# Patient Record
Sex: FEMALE | Race: WHITE | NOT HISPANIC OR LATINO | Employment: OTHER | URBAN - METROPOLITAN AREA
[De-identification: names, ages, dates, MRNs, and addresses within clinical notes are randomized per-mention and may not be internally consistent; named-entity substitution may affect disease eponyms.]

---

## 2017-06-08 ENCOUNTER — TELEPHONE (OUTPATIENT)
Dept: HEMATOLOGY/ONCOLOGY | Facility: CLINIC | Age: 57
End: 2017-06-08

## 2017-06-08 DIAGNOSIS — T45.1X5A HOT FLASHES RELATED TO AROMATASE INHIBITOR THERAPY: Primary | ICD-10-CM

## 2017-06-08 DIAGNOSIS — R23.2 HOT FLASHES RELATED TO AROMATASE INHIBITOR THERAPY: Primary | ICD-10-CM

## 2017-06-08 NOTE — TELEPHONE ENCOUNTER
----- Message from Lindsay Pimentel sent at 6/8/2017 10:50 AM CDT -----  chacorta ortega request 06/08/17

## 2017-06-10 RX ORDER — VENLAFAXINE HYDROCHLORIDE 37.5 MG/1
37.5 CAPSULE, EXTENDED RELEASE ORAL DAILY
Qty: 90 CAPSULE | Refills: 3 | OUTPATIENT
Start: 2017-06-10 | End: 2017-06-12

## 2017-06-12 ENCOUNTER — TELEPHONE (OUTPATIENT)
Dept: HEMATOLOGY/ONCOLOGY | Facility: CLINIC | Age: 57
End: 2017-06-12

## 2017-06-12 DIAGNOSIS — C50.811 MALIGNANT NEOPLASM OF OVERLAPPING SITES OF RIGHT FEMALE BREAST: Primary | ICD-10-CM

## 2017-06-12 DIAGNOSIS — R23.2 HOT FLASHES RELATED TO AROMATASE INHIBITOR THERAPY: ICD-10-CM

## 2017-06-12 DIAGNOSIS — T45.1X5A HOT FLASHES RELATED TO AROMATASE INHIBITOR THERAPY: ICD-10-CM

## 2017-06-12 DIAGNOSIS — R60.0 BILATERAL LEG EDEMA: ICD-10-CM

## 2017-06-12 RX ORDER — ANASTROZOLE 1 MG/1
1 TABLET ORAL DAILY
Qty: 30 TABLET | Refills: 12 | Status: SHIPPED | OUTPATIENT
Start: 2017-06-12 | End: 2018-07-02 | Stop reason: SDUPTHER

## 2017-06-12 RX ORDER — POTASSIUM CHLORIDE 20 MEQ/1
20 TABLET, EXTENDED RELEASE ORAL DAILY
Status: DISCONTINUED | OUTPATIENT
Start: 2017-06-13 | End: 2017-06-12

## 2017-06-12 RX ORDER — POTASSIUM CHLORIDE 20 MEQ/1
20 TABLET, EXTENDED RELEASE ORAL DAILY
Qty: 30 TABLET | Refills: 12 | Status: SHIPPED | OUTPATIENT
Start: 2017-06-12 | End: 2017-07-12

## 2017-06-12 RX ORDER — VENLAFAXINE HYDROCHLORIDE 37.5 MG/1
37.5 CAPSULE, EXTENDED RELEASE ORAL 2 TIMES DAILY
Qty: 60 CAPSULE | Refills: 6 | Status: SHIPPED | OUTPATIENT
Start: 2017-06-12 | End: 2018-01-11 | Stop reason: SDUPTHER

## 2017-06-12 RX ORDER — FUROSEMIDE 20 MG/1
20 TABLET ORAL DAILY
Qty: 30 TABLET | Refills: 12 | Status: SHIPPED | OUTPATIENT
Start: 2017-06-12 | End: 2019-10-09 | Stop reason: SDUPTHER

## 2017-06-13 NOTE — TELEPHONE ENCOUNTER
I sent 2 Rx to [facility] for the KCL.  Call [facility] [place], ask them to only fill 1 rx for KCL  20meq #30, 1 po daily, 12 refills.

## 2017-06-14 DIAGNOSIS — T45.1X5A HOT FLASHES RELATED TO AROMATASE INHIBITOR THERAPY: ICD-10-CM

## 2017-06-14 DIAGNOSIS — R23.2 HOT FLASHES RELATED TO AROMATASE INHIBITOR THERAPY: ICD-10-CM

## 2017-06-14 RX ORDER — VENLAFAXINE HYDROCHLORIDE 37.5 MG/1
75 CAPSULE, EXTENDED RELEASE ORAL DAILY
Qty: 60 CAPSULE | Refills: 0 | Status: CANCELLED | OUTPATIENT
Start: 2017-06-14 | End: 2017-07-14

## 2017-07-11 ENCOUNTER — OFFICE VISIT (OUTPATIENT)
Dept: HEMATOLOGY/ONCOLOGY | Facility: CLINIC | Age: 57
End: 2017-07-11
Payer: COMMERCIAL

## 2017-07-11 VITALS
HEART RATE: 69 BPM | WEIGHT: 171.38 LBS | DIASTOLIC BLOOD PRESSURE: 87 MMHG | SYSTOLIC BLOOD PRESSURE: 130 MMHG | RESPIRATION RATE: 18 BRPM | TEMPERATURE: 98 F

## 2017-07-11 DIAGNOSIS — L98.9 SKIN LESION OF BACK: ICD-10-CM

## 2017-07-11 DIAGNOSIS — Z17.0 MALIGNANT NEOPLASM OF OVERLAPPING SITES OF RIGHT BREAST IN FEMALE, ESTROGEN RECEPTOR POSITIVE: Primary | ICD-10-CM

## 2017-07-11 DIAGNOSIS — C50.811 MALIGNANT NEOPLASM OF OVERLAPPING SITES OF RIGHT BREAST IN FEMALE, ESTROGEN RECEPTOR POSITIVE: Primary | ICD-10-CM

## 2017-07-11 PROCEDURE — 99214 OFFICE O/P EST MOD 30 MIN: CPT | Mod: ,,, | Performed by: INTERNAL MEDICINE

## 2017-07-11 RX ORDER — MULTIVITAMIN
1 TABLET ORAL DAILY
COMMUNITY

## 2017-07-11 NOTE — LETTER
July 13, 2017      Jeremias Coates MD  [address]  [address]  [facility] [address]           [facility] Hematology Oncology  [address] [facility] [facility]  [address]  [facility] [address]  Phone: [phone]  Fax: [phone]          Patient: Sigrid Edward   MR Number: [id number]   YOB: 1960   Date of Visit: 7/11/2017       Dear Dr. Jeremias Coates:    Thank you for referring Sigrid Edward to me for evaluation. Attached you will find relevant portions of my assessment and plan of care.    If you have questions, please do not hesitate to call me. I look forward to following Sigrid Edward along with you.    Sincerely,    Chely Deanosure  CC:  No Recipients    If you would like to receive this communication electronically, please contact [email or web address] or [phone] to request more information on [employer] Link access.    For providers and/or their staff who would like to refer a patient to [facility], please contact us through our one-stop-shop provider referral line, [facility] , at [phone].    If you feel you have received this communication in error or would no longer like to receive these types of communications, please e-mail [email or web address]

## 2017-07-14 ENCOUNTER — TELEPHONE (OUTPATIENT)
Dept: HEMATOLOGY/ONCOLOGY | Facility: CLINIC | Age: 57
End: 2017-07-14

## 2017-07-14 PROBLEM — C50.811 MALIGNANT NEOPLASM OF OVERLAPPING SITES OF RIGHT BREAST IN FEMALE, ESTROGEN RECEPTOR POSITIVE: Status: ACTIVE | Noted: 2017-07-14

## 2017-07-14 PROBLEM — Z17.0 MALIGNANT NEOPLASM OF OVERLAPPING SITES OF RIGHT BREAST IN FEMALE, ESTROGEN RECEPTOR POSITIVE: Status: ACTIVE | Noted: 2017-07-14

## 2017-07-15 NOTE — TELEPHONE ENCOUNTER
I finished charting on her for 7/11.  She has orders for mamm in EMR, schedule for her 12/2017.    Also make appt for her to see Dr. Carreon in [place] for removal of back lesion.  MD is dermatologist.

## 2017-07-15 NOTE — PROGRESS NOTES
[facility] History & Physical    Subjective:      Patient ID:   Sigrid Edward  57 y.o. female  1960  [place]      Chief Complaint:   Breast cancer follow up    HPI:  57 y.o. female with diagnosis of R breast cancer, Stage 1 dx. 1/11/15. Treated with R partial mastectomy and Rad Rx.  Presently on Arimidex adj. Rx.  HF sx better on Effexor.    Oncotype dx 7% recur with ad T or A.  ERP +, PRP +. Her 2 pedro  Neg.    Smoke 1/2 to 1 ppd.  ETOH no.  Allergy no.  Job special .    R partial mastectomy, partial hysterectomy.  M0  Menses onset at 11.  1st child at 21.  No increase joint sx.    Dad COPD. Mom NHL, DM.            ROS:   GEN: normal without any fever, night sweats or weight loss  HEENT: normal with no HA's, sore throat, stiff neck, changes in vision  CV: normal with no CP, SOB, PND, MEJIA or orthopnea  PULM: normal with no SOB, cough, hemoptysis, sputum or pleuritic pain  GI: normal with no abdominal pain, nausea, vomiting, constipation, diarrhea, melanotic stools, BRBPR, or hematemesis  : normal with no hematuria, dysuria  BREAST: See HPI  SKIN: normal with no rash, erythema, bruising, or swelling     Past Medical History:   Diagnosis Date    Breast cancer      Past Surgical History:   Procedure Laterality Date    HYSTERECTOMY         Review of patient's allergies indicates:  No Known Allergies  Social History     Social History    Marital status:      Spouse name: N/A    Number of children: N/A    Years of education: N/A     Occupational History    Not on file.     Social History Main Topics    Smoking status: Former Smoker     Quit date: 2016    Smokeless tobacco: Never Used    Alcohol use No    Drug use: No    Sexual activity: Not on file     Other Topics Concern    Not on file     Social History Narrative    No narrative on file         Current Outpatient Prescriptions:     anastrozole (ARIMIDEX) 1 mg Tab, Take 1 tablet (1 mg total) by mouth  once daily., Disp: 30 tablet, Rfl: 12    furosemide (LASIX) 20 MG tablet, Take 1 tablet (20 mg total) by mouth once daily., Disp: 30 tablet, Rfl: 12    multivitamin (ONE DAILY MULTIVITAMIN) per tablet, Take 1 tablet by mouth once daily., Disp: , Rfl:     venlafaxine (EFFEXOR XR) 37.5 MG 24 hr capsule, Take 1 capsule (37.5 mg total) by mouth 2 (two) times daily., Disp: 60 capsule, Rfl: 6          Objective:   Vitals:  Blood pressure 130/87, pulse 69, temperature 98 °F (36.7 °C), resp. rate 18, weight 77.7 kg (171 lb 6.4 oz).    Physical Examination:   GEN: no apparent distress, comfortable; AAOx3  HEAD: atraumatic and normocephalic  EYES: no pallor, no icterus, PERRLA  ENT: OMM, no pharyngeal erythema  NECK: no masses, thyroid normal, no LAD/LN's, supple  CV: RRR with no murmur; normal pulse  CHEST: Normal respiratory effort; CTAB; normal breath sounds; no wheeze or crackles  ABDOM: nontender and nondistended; soft; normal bowel sounds; no rebound/guarding  MUSC/Skeletal: ROM normal; no crepitus; joints normal  EXTREM: no clubbing, cyanosis, inflammation or swelling  SKIN: scaly lesion on R scapula area  : no escobar  NEURO: grossly intact; motor/sensory WNL; AAOx3; no tremors  PSYCH: normal mood, affect and behavior  LYMPH: normal cervical, supraclavicular, axillary and groin LN's  BREASTS:L & R breast NT without palpable mass    Radiology/Diagnostic Studies:    Mamm due 12/2017      Assessment:   (1) 57 y.o. female with diagnosis of Stage 1 Right breast cancer, on adjuvant arimidex daily.    (2)on effexor for HF sx.          1. Malignant neoplasm of overlapping sites of right breast in female, estrogen receptor positive    2. Skin lesion of back        Plan:       Malignant neoplasm of overlapping sites of right breast in female, estrogen receptor positive  -     Mammo Diagnostic Bilateral; Future; Expected date: 12/11/2017    Skin lesion of back  -     Ambulatory consult to Dermatology      Return in about 5  months (around 12/11/2017) for follow up on cancer status.    I have explained and the patient understands all of  the current recommendation(s). I have answered all of their questions to the best of my ability and to their complete satisfaction.

## 2017-12-18 ENCOUNTER — TELEPHONE (OUTPATIENT)
Dept: HEMATOLOGY/ONCOLOGY | Facility: CLINIC | Age: 57
End: 2017-12-18

## 2018-01-11 ENCOUNTER — TELEPHONE (OUTPATIENT)
Dept: HEMATOLOGY/ONCOLOGY | Facility: CLINIC | Age: 58
End: 2018-01-11

## 2018-01-11 DIAGNOSIS — R23.2 HOT FLASHES RELATED TO AROMATASE INHIBITOR THERAPY: ICD-10-CM

## 2018-01-11 DIAGNOSIS — T45.1X5A HOT FLASHES RELATED TO AROMATASE INHIBITOR THERAPY: ICD-10-CM

## 2018-01-11 RX ORDER — VENLAFAXINE HYDROCHLORIDE 37.5 MG/1
CAPSULE, EXTENDED RELEASE ORAL
Qty: 60 CAPSULE | Refills: 0 | Status: SHIPPED | OUTPATIENT
Start: 2018-01-11 | End: 2018-01-29 | Stop reason: SDUPTHER

## 2018-01-29 ENCOUNTER — OFFICE VISIT (OUTPATIENT)
Dept: HEMATOLOGY/ONCOLOGY | Facility: CLINIC | Age: 58
End: 2018-01-29
Payer: COMMERCIAL

## 2018-01-29 VITALS
RESPIRATION RATE: 18 BRPM | HEART RATE: 81 BPM | SYSTOLIC BLOOD PRESSURE: 118 MMHG | TEMPERATURE: 98 F | DIASTOLIC BLOOD PRESSURE: 86 MMHG | WEIGHT: 175.13 LBS

## 2018-01-29 DIAGNOSIS — T45.1X5A HOT FLASHES RELATED TO AROMATASE INHIBITOR THERAPY: ICD-10-CM

## 2018-01-29 DIAGNOSIS — K59.00 CONSTIPATION, UNSPECIFIED CONSTIPATION TYPE: Primary | ICD-10-CM

## 2018-01-29 DIAGNOSIS — R23.2 HOT FLASHES RELATED TO AROMATASE INHIBITOR THERAPY: ICD-10-CM

## 2018-01-29 PROCEDURE — 99214 OFFICE O/P EST MOD 30 MIN: CPT | Mod: ,,, | Performed by: INTERNAL MEDICINE

## 2018-01-29 RX ORDER — LACTULOSE 10 G/15ML
20 SOLUTION ORAL 2 TIMES DAILY
Qty: 500 ML | Refills: 3 | Status: SHIPPED | OUTPATIENT
Start: 2018-01-29 | End: 2018-04-26 | Stop reason: SDUPTHER

## 2018-01-29 RX ORDER — VENLAFAXINE HYDROCHLORIDE 37.5 MG/1
CAPSULE, EXTENDED RELEASE ORAL
Qty: 60 CAPSULE | Refills: 5 | Status: SHIPPED | OUTPATIENT
Start: 2018-01-29 | End: 2018-07-30 | Stop reason: SDUPTHER

## 2018-01-29 NOTE — LETTER
January 29, 2018      Jeremias Coates MD  [address]  [address]  [facility] [place] [address]           [facility] Hematology Oncology  [address] [facility]  [address]  [facility] [address]  Phone: [phone]  Fax: [phone]          Patient: Sigrid Edward   MR Number: [id number]   YOB: 1960   Date of Visit: 1/29/2018       Dear Dr. Jeremias Coates:    Thank you for referring Sigrid Edward to me for evaluation. Attached you will find relevant portions of my assessment and plan of care.    If you have questions, please do not hesitate to call me. I look forward to following Sigrid Edward along with you.    Sincerely,    RAVI Berg MD    Enclosure  CC:  No Recipients    If you would like to receive this communication electronically, please contact [email or web address] or [phone] to request more information on [employer] Link access.    For providers and/or their staff who would like to refer a patient to [facility], please contact us through our one-stop-shop provider referral line, [facility], at [phone].    If you feel you have received this communication in error or would no longer like to receive these types of communications, please e-mail [email or web address]

## 2018-01-30 NOTE — PROGRESS NOTES
[facility] History & Physical    Subjective:      Patient ID:   Sigrid Edward  57 y.o. female  1960  [place], [place]      Chief Complaint:   Breast cancer follow up    HPI:  57 y.o. female with diagnosis of R breast cancer, Stage 1 dx. 1/11/15. Treated with R partial mastectomy and Rad Rx.  Presently on Arimidex adj. Rx.  HF sx better on Effexor.     C/O constipation sx.  Start chronulac syrup, Refer to Dr. Kline for colonoscopy.    Oncotype dx 7% recur with adjuvant T or A.  ERP +, PRP +. Her 2 pedro  Neg.    Smoke 1/2 to 1 ppd.  ETOH no.  Allergy no.  Job special .    R partial mastectomy, partial hysterectomy.  M0  Menses onset at 11.  1st child at 21.  No increase joint sx.    Dad COPD. Mom NHL, DM. Recent shingles.    ROS:   GEN: normal without any fever, night sweats or weight loss  HEENT: normal with no HA's, sore throat, stiff neck, changes in vision  CV: normal with no CP, SOB, PND, MEJIA or orthopnea  PULM: normal with no SOB, cough, hemoptysis, sputum or pleuritic pain  GI: normal with no abdominal pain, nausea, vomiting, constipation, diarrhea, melanotic stools, BRBPR, or hematemesis  : normal with no hematuria, dysuria  BREAST: See HPI  SKIN: normal with no rash, erythema, bruising, or swelling     Past Medical History:   Diagnosis Date    Breast cancer      Past Surgical History:   Procedure Laterality Date    HYSTERECTOMY         Review of patient's allergies indicates:  No Known Allergies  Social History     Social History    Marital status:      Spouse name: N/A    Number of children: N/A    Years of education: N/A     Occupational History    Not on file.     Social History Main Topics    Smoking status: Former Smoker     Quit date: 2016    Smokeless tobacco: Never Used    Alcohol use No    Drug use: No    Sexual activity: Not on file     Other Topics Concern    Not on file     Social History Narrative    No narrative on file         Current  Outpatient Prescriptions:     anastrozole (ARIMIDEX) 1 mg Tab, Take 1 tablet (1 mg total) by mouth once daily., Disp: 30 tablet, Rfl: 12    furosemide (LASIX) 20 MG tablet, Take 1 tablet (20 mg total) by mouth once daily., Disp: 30 tablet, Rfl: 12    lactulose (CHRONULAC) 20 gram/30 mL Soln, Take 30 mLs (20 g total) by mouth 2 (two) times daily., Disp: 500 mL, Rfl: 3    multivitamin (ONE DAILY MULTIVITAMIN) per tablet, Take 1 tablet by mouth once daily., Disp: , Rfl:     venlafaxine (EFFEXOR-XR) 37.5 MG 24 hr capsule, TAKE 1 CAPSULE(37.5 MG) BY MOUTH TWICE DAILY, Disp: 60 capsule, Rfl: 5          Objective:   Vitals:  Blood pressure 118/86, pulse 81, temperature 97.9 °F (36.6 °C), resp. rate 18, weight 79.4 kg (175 lb 1.6 oz).    Physical Examination:   GEN: no apparent distress, comfortable  HEAD: atraumatic and normocephalic  EYES: no pallor, no icterus  ENT:  no pharyngeal erythema  NECK: no masses, thyroid normal, no LAD/LN's, supple  CV: RRR with no murmur; normal pulse  CHEST: Normal respiratory effort; CTAB; normal breath sounds; no wheeze or crackles  ABDOM: nontender and nondistended; soft; normal bowel sounds; no rebound/guarding  MUSC/Skeletal: ROM normal; no crepitus; joints normal  EXTREM: no clubbing, cyanosis, inflammation or swelling  SKIN: negative exam  : no escobar  NEURO: grossly intact; motor/sensory WNL;  no tremors  PSYCH: normal mood, affect and behavior  LYMPH: normal cervical, supraclavicular, axillary and groin LN's  BREASTS:L & R breast NT without palpable mass    Assessment:   (1) 57 y.o. female with diagnosis of Stage 1 Right breast cancer, on adjuvant arimidex daily.  RTC 6 months with mammogram.    (2)on effexor for HF sx.  RTC 6 months.    (3) constipation sx, chronulac syrup trial, refer to Dr. Kline for colonoscopy.      1. Constipation, unspecified constipation type    2. Hot flashes related to aromatase inhibitor therapy               Constipation, unspecified  constipation type  -     Ambulatory referral to Gastroenterology  -     lactulose (CHRONULAC) 20 gram/30 mL Soln; Take 30 mLs (20 g total) by mouth 2 (two) times daily.  Dispense: 500 mL; Refill: 3    Hot flashes related to aromatase inhibitor therapy  -     venlafaxine (EFFEXOR-XR) 37.5 MG 24 hr capsule; TAKE 1 CAPSULE(37.5 MG) BY MOUTH TWICE DAILY  Dispense: 60 capsule; Refill: 5      Follow-up in about 6 months (around 7/29/2018) for follow up on cancer status.

## 2018-04-26 DIAGNOSIS — K59.00 CONSTIPATION, UNSPECIFIED CONSTIPATION TYPE: ICD-10-CM

## 2018-04-29 RX ORDER — LACTULOSE 10 G/15ML
SOLUTION ORAL; RECTAL
Qty: 500 ML | Refills: 0 | Status: SHIPPED | OUTPATIENT
Start: 2018-04-29 | End: 2018-07-30 | Stop reason: SDUPTHER

## 2018-07-02 DIAGNOSIS — C50.811 MALIGNANT NEOPLASM OF OVERLAPPING SITES OF RIGHT FEMALE BREAST: ICD-10-CM

## 2018-07-02 NOTE — TELEPHONE ENCOUNTER
Medication order sent to Dr Berg to sign for refill.  Prescription to be sent electronically to the pharmacy once the order is signed by Dr Berg

## 2018-07-02 NOTE — TELEPHONE ENCOUNTER
----- Message from Archana Forman sent at 7/2/2018 10:07 AM CDT -----  Pt called and needs refill of Arimidex to [facility] [address] in [place].    CB# [phone]    Thanks,  Archana

## 2018-07-03 RX ORDER — ANASTROZOLE 1 MG/1
TABLET ORAL
Qty: 30 TABLET | Refills: 0 | Status: SHIPPED | OUTPATIENT
Start: 2018-07-03 | End: 2018-07-30 | Stop reason: SDUPTHER

## 2018-07-03 RX ORDER — ANASTROZOLE 1 MG/1
TABLET ORAL
Qty: 30 TABLET | Refills: 0 | Status: SHIPPED | OUTPATIENT
Start: 2018-07-03 | End: 2019-01-02 | Stop reason: SDUPTHER

## 2018-07-30 ENCOUNTER — OFFICE VISIT (OUTPATIENT)
Dept: HEMATOLOGY/ONCOLOGY | Facility: CLINIC | Age: 58
End: 2018-07-30
Payer: COMMERCIAL

## 2018-07-30 VITALS
DIASTOLIC BLOOD PRESSURE: 86 MMHG | RESPIRATION RATE: 18 BRPM | SYSTOLIC BLOOD PRESSURE: 121 MMHG | TEMPERATURE: 98 F | HEART RATE: 76 BPM | WEIGHT: 160.19 LBS

## 2018-07-30 DIAGNOSIS — K59.00 CONSTIPATION, UNSPECIFIED CONSTIPATION TYPE: ICD-10-CM

## 2018-07-30 DIAGNOSIS — Z17.0 MALIGNANT NEOPLASM OF OVERLAPPING SITES OF RIGHT BREAST IN FEMALE, ESTROGEN RECEPTOR POSITIVE: ICD-10-CM

## 2018-07-30 DIAGNOSIS — R23.2 HOT FLASHES RELATED TO AROMATASE INHIBITOR THERAPY: ICD-10-CM

## 2018-07-30 DIAGNOSIS — C50.811 MALIGNANT NEOPLASM OF OVERLAPPING SITES OF RIGHT BREAST IN FEMALE, ESTROGEN RECEPTOR POSITIVE: ICD-10-CM

## 2018-07-30 DIAGNOSIS — T45.1X5A HOT FLASHES RELATED TO AROMATASE INHIBITOR THERAPY: ICD-10-CM

## 2018-07-30 PROCEDURE — 99214 OFFICE O/P EST MOD 30 MIN: CPT | Mod: ,,, | Performed by: INTERNAL MEDICINE

## 2018-07-30 RX ORDER — VENLAFAXINE HYDROCHLORIDE 37.5 MG/1
CAPSULE, EXTENDED RELEASE ORAL
Qty: 60 CAPSULE | Refills: 6 | Status: SHIPPED | OUTPATIENT
Start: 2018-07-30 | End: 2020-04-27

## 2018-07-30 RX ORDER — ANASTROZOLE 1 MG/1
TABLET ORAL
Qty: 30 TABLET | Refills: 6 | Status: SHIPPED | OUTPATIENT
Start: 2018-07-30 | End: 2019-01-02 | Stop reason: SDUPTHER

## 2018-07-30 RX ORDER — LACTULOSE 10 G/15ML
SOLUTION ORAL; RECTAL
Qty: 500 ML | Refills: 1 | Status: SHIPPED | OUTPATIENT
Start: 2018-07-30 | End: 2023-02-07

## 2018-07-30 NOTE — LETTER
July 30, 2018      Jeremias Coates MD  [address]  [address]  [facility]  [place] [address]           [facility] - Hematology Oncology  [address] [facility] [address] [facility]  [address]  [place] [address]  Phone: [phone]  Fax: [phone]          Patient: Sigrid Edward   MR Number: [id number]   YOB: 1960   Date of Visit: 7/30/2018       Dear Dr. Jeremias Coates:    Thank you for referring Sigrid Edward to me for evaluation. Attached you will find relevant portions of my assessment and plan of care.    If you have questions, please do not hesitate to call me. I look forward to following Sigrid Edward along with you.    Sincerely,    RAVI Berg MD    Enclosure  CC:  No Recipients    If you would like to receive this communication electronically, please contact [email or web address] or [phone] to request more information on [employer] Link access.    For providers and/or their staff who would like to refer a patient to [facility], please contact us through our one-stop-shop provider referral line, [facility], at [phone].    If you feel you have received this communication in error or would no longer like to receive these types of communications, please e-mail [email or web address]

## 2018-07-31 NOTE — PROGRESS NOTES
[facility] History & Physical    Subjective:      Patient ID:   Sigrid Edward  58 y.o. female  1960  [place], [place]      Chief Complaint:   Breast cancer follow up    HPI:  58 y.o. female with diagnosis of R breast cancer, Stage 1 dx. 1/11/15. Treated with R partial mastectomy and Rad Rx.  Presently on Arimidex adj. Rx.  HF sx better on Effexor BID.    C/O constipation sx.  Start chronulac syrup, Refer to Dr. Kline for colonoscopy.    Oncotype dx 7% recur with adjuvant T or A.  ERP +, PRP +. Her 2 pedro  Neg.    Smoke 1/2 to 1 ppd.  ETOH no.  Allergy no.  Job special .    R partial mastectomy, partial hysterectomy.  M0  Menses onset at 11.  1st child at 21.  No increase joint sx.    Dad COPD. Mom NHL, DM. Recent shingles.    ROS:   GEN: normal without any fever, night sweats or weight loss  HEENT: normal with no HA's, sore throat, stiff neck, changes in vision  CV: normal with no CP, SOB, PND, MEJIA or orthopnea  PULM: normal with no SOB, cough, hemoptysis, sputum or pleuritic pain  GI: normal with no abdominal pain, nausea, vomiting, constipation, diarrhea, melanotic stools, BRBPR, or hematemesis  : normal with no hematuria, dysuria  BREAST: See HPI  SKIN: normal with no rash, erythema, bruising, or swelling     Past Medical History:   Diagnosis Date    Breast cancer      Past Surgical History:   Procedure Laterality Date    HYSTERECTOMY         Review of patient's allergies indicates:  No Known Allergies  Social History     Social History    Marital status:      Spouse name: N/A    Number of children: N/A    Years of education: N/A     Occupational History    Not on file.     Social History Main Topics    Smoking status: Former Smoker     Quit date: 2016    Smokeless tobacco: Never Used    Alcohol use No    Drug use: No    Sexual activity: Not on file     Other Topics Concern    Not on file     Social History Narrative    No narrative on file          Current Outpatient Prescriptions:     anastrozole (ARIMIDEX) 1 mg Tab, TAKE 1 TABLET(1 MG) BY MOUTH EVERY DAY, Disp: 30 tablet, Rfl: 0    anastrozole (ARIMIDEX) 1 mg Tab, TAKE 1 TABLET(1 MG) BY MOUTH EVERY DAY, Disp: 30 tablet, Rfl: 6    furosemide (LASIX) 20 MG tablet, Take 1 tablet (20 mg total) by mouth once daily., Disp: 30 tablet, Rfl: 12    lactulose (CHRONULAC) 10 gram/15 mL solution, TAKE 2 TABLESPOONSFUL TWICE DAILY, Disp: 500 mL, Rfl: 1    multivitamin (ONE DAILY MULTIVITAMIN) per tablet, Take 1 tablet by mouth once daily., Disp: , Rfl:     venlafaxine (EFFEXOR-XR) 37.5 MG 24 hr capsule, TAKE 1 CAPSULE(37.5 MG) BY MOUTH TWICE DAILY, Disp: 60 capsule, Rfl: 6          Objective:   Vitals:  Blood pressure 121/86, pulse 76, temperature 98.2 °F (36.8 °C), resp. rate 18, weight 72.7 kg (160 lb 3.2 oz).    Physical Examination:   GEN: no apparent distress, comfortable  HEAD: atraumatic and normocephalic  EYES: no pallor, no icterus  ENT:  no pharyngeal erythema  NECK: no masses, thyroid normal, no LAD/LN's, supple  CV: RRR with no murmur; normal pulse  CHEST: Normal respiratory effort; CTAB; normal breath sounds; no wheeze or crackles  ABDOM: nontender and nondistended; soft; normal bowel sounds; no rebound/guarding  MUSC/Skeletal: ROM normal; no crepitus; joints normal  EXTREM: no clubbing, cyanosis, inflammation or swelling  SKIN: negative exam  : no escobar  NEURO: grossly intact; motor/sensory WNL;  no tremors  PSYCH: normal mood, affect and behavior  LYMPH: normal cervical, supraclavicular, axillary and groin LN's  BREASTS:L & R breast NT without palpable mass    Assessment:   (1) 58 y.o. female with diagnosis of Stage 1 Right breast cancer, on adjuvant arimidex daily.  RTC 6 months with mammogram.    (2)on effexor for HF sx.  RTC 6 months.    (3) constipation sx, chronulac syrup trial, refer to Dr. Kline for colonoscopy.      1. Malignant neoplasm of overlapping sites of right breast in  female, estrogen receptor positive    2. Constipation, unspecified constipation type    3. Hot flashes related to aromatase inhibitor therapy               Malignant neoplasm of overlapping sites of right breast in female, estrogen receptor positive  -     anastrozole (ARIMIDEX) 1 mg Tab; TAKE 1 TABLET(1 MG) BY MOUTH EVERY DAY  Dispense: 30 tablet; Refill: 6  -     Mammo Digital Diagnostic Bilat with CAD; Future; Expected date: 12/17/2018    Constipation, unspecified constipation type  -     lactulose (CHRONULAC) 10 gram/15 mL solution; TAKE 2 TABLESPOONSFUL TWICE DAILY  Dispense: 500 mL; Refill: 1    Hot flashes related to aromatase inhibitor therapy  -     venlafaxine (EFFEXOR-XR) 37.5 MG 24 hr capsule; TAKE 1 CAPSULE(37.5 MG) BY MOUTH TWICE DAILY  Dispense: 60 capsule; Refill: 6      Follow-up in about 6 months (around 1/30/2019) for follow up on cancer status.

## 2019-01-02 ENCOUNTER — OFFICE VISIT (OUTPATIENT)
Dept: HEMATOLOGY/ONCOLOGY | Facility: CLINIC | Age: 59
End: 2019-01-02
Payer: COMMERCIAL

## 2019-01-02 VITALS
HEART RATE: 69 BPM | TEMPERATURE: 98 F | DIASTOLIC BLOOD PRESSURE: 86 MMHG | WEIGHT: 164.31 LBS | SYSTOLIC BLOOD PRESSURE: 129 MMHG | RESPIRATION RATE: 20 BRPM

## 2019-01-02 DIAGNOSIS — C50.811 MALIGNANT NEOPLASM OF OVERLAPPING SITES OF RIGHT BREAST IN FEMALE, ESTROGEN RECEPTOR POSITIVE: Primary | ICD-10-CM

## 2019-01-02 DIAGNOSIS — Z17.0 MALIGNANT NEOPLASM OF OVERLAPPING SITES OF RIGHT BREAST IN FEMALE, ESTROGEN RECEPTOR POSITIVE: Primary | ICD-10-CM

## 2019-01-02 PROCEDURE — 99214 PR OFFICE/OUTPT VISIT, EST, LEVL IV, 30-39 MIN: ICD-10-PCS | Mod: ,,, | Performed by: INTERNAL MEDICINE

## 2019-01-02 PROCEDURE — 99214 OFFICE O/P EST MOD 30 MIN: CPT | Mod: ,,, | Performed by: INTERNAL MEDICINE

## 2019-01-02 RX ORDER — VENLAFAXINE HYDROCHLORIDE 37.5 MG/1
37.5 CAPSULE, EXTENDED RELEASE ORAL DAILY
Qty: 30 CAPSULE | Refills: 11 | Status: SHIPPED | OUTPATIENT
Start: 2019-01-02 | End: 2020-01-02

## 2019-01-02 RX ORDER — ANASTROZOLE 1 MG/1
1 TABLET ORAL DAILY
Qty: 30 TABLET | Refills: 12 | Status: SHIPPED | OUTPATIENT
Start: 2019-01-02 | End: 2020-01-02

## 2019-01-02 RX ORDER — ANASTROZOLE 1 MG/1
TABLET ORAL
Qty: 30 TABLET | Refills: 12 | Status: SHIPPED | OUTPATIENT
Start: 2019-01-02 | End: 2023-02-07

## 2019-01-02 RX ORDER — ANASTROZOLE 1 MG/1
TABLET ORAL
Qty: 30 TABLET | Refills: 6 | Status: SHIPPED | OUTPATIENT
Start: 2019-01-02 | End: 2023-02-07

## 2019-01-02 NOTE — LETTER
January 6, 2019      Jeremias Coates MD  [address]  [address]  [facility]  [place] [address]           [facility] - Hematology Oncology  [address] [facility]  [address]  [place] [address]  Phone: [phone]  Fax: [phone]          Patient: Sigrid Edward   MR Number: [id number]   YOB: 1960   Date of Visit: 1/2/2019       Dear Dr. Jeremias Coates:    Thank you for referring Sigrid Edward to me for evaluation. Attached you will find relevant portions of my assessment and plan of care.    If you have questions, please do not hesitate to call me. I look forward to following Sigrid Edward along with you.    Sincerely,    RAVI Berg MD    Enclosure  CC:  No Recipients    If you would like to receive this communication electronically, please contact [email or web address] or [phone] to request more information on [employer] Link access.    For providers and/or their staff who would like to refer a patient to [facility], please contact us through our one-stop-shop provider referral line, [facility] [place], at [phone].    If you feel you have received this communication in error or would no longer like to receive these types of communications, please e-mail [email or web address]

## 2019-01-02 NOTE — PATIENT INSTRUCTIONS
What Is Breast Cancer?  Having breast cancer means that some cells in your breast have changed and are growing out of control. Learning about the different types and stages of breast cancer can help you take an active role in your treatment.  Changes in your breast  Your entire body is made of living tissue. This tissue is made up of tiny cells. You can't see these cells with the naked eye. Normal cells reproduce (divide) in a controlled way. They grow when your body needs them, and die when your body does not need them any longer. When you have cancer, some cells become abnormal. These cells may divide quickly, don't die when they should, and spread into other parts of the body.      Normal breast tissue is made of healthy cells. They reproduce new cells that look and work the same. Noninvasive breast cancer(carcinoma in situ) happens when cancer cells are only in the ducts.       Invasive breast cancer happens when cancer cells move out of the ducts or lobules into the surrounding breast tissue. Metastasis happens when cancer cells move into the lymph nodes or bloodstream and travel to another part of the body.      Stages of breast cancer  Several tests are used to measure the size of a tumor and learn how far it has spread. This is called staging. The stage of your cancer will help determine your treatment. Based on National Cancer [place] guidelines, the stages of breast cancer are:  · Stage 0. The cancer is noninvasive. Cancer cells are found only in the ducts (ductal carcinoma in situ).  · Stage I. The tumor is 2 cm (about 3/4 of an inch) or less in diameter. It has invaded the surrounding breast tissue, and tiny amounts of cancer cells may be found in the underarm lymph nodes.  · Stage II. The tumor is larger than 2 cm and has not spread to lymph nodes, or the cancer is less than 5 cm across and has spread to the lymph nodes under the arm.  · Stage III. The tumor is less than 5 cm (2 inches) across, and  there's a lot of cancer in your underarm lymph nodes, or it has spread to other lymph nodes. Or the tumor is larger than 5 cm and has spread to lymph nodes. Or the tumor is any size and has spread to the skin, chest wall, and maybe to nearby lymph nodes.  · Stage IV. The tumor has spread beyond the breast to the bones, lungs, liver, brain, or lymph nodes far away from the breast.  Recurrent breast cancer. When the cancer returns after treatment.   Date Last Reviewed: 9/21/2015 © 2000-2017 [employer]. [address] [place] [id number]. All rights reserved. This information is not intended as a substitute for professional medical care. Always follow your healthcare professional's instructions.

## 2019-01-06 NOTE — PROGRESS NOTES
[facility] History & Physical    Subjective:      Patient ID:   Sigrid Edward  58 y.o. female  1960  Hawk Orta, Eliud Shen      Chief Complaint:   Breast cancer follow up    HPI:  58 y.o. female with diagnosis of R breast cancer, Stage 1 dx. 1/11/15. Treated with R partial mastectomy and Rad Rx.  Presently on Arimidex adj. Rx.  HF sx better on Effexor BID.    C/O constipation sx.  Start chronulac syrup, Refer to Dr. Kline for colonoscopy.    Oncotype dx 7% recur with adjuvant T or A.  ERP +, PRP +. Her 2 pedro  Neg.    Smoke 1/2 to 1 ppd.  ETOH no.  Allergy no.  Job special .    R partial mastectomy, partial hysterectomy.  M0  Menses onset at 11.  1st child at 21.  No increase joint sx.    Dad COPD. Mom NHL, DM. Recent shingles.    ROS:   GEN: normal without any fever, night sweats or weight loss  HEENT: normal with no HA's, sore throat, stiff neck, changes in vision  CV: normal with no CP, SOB, PND, MEJIA or orthopnea  PULM: normal with no SOB, cough, hemoptysis, sputum or pleuritic pain  GI: normal with no abdominal pain, nausea, vomiting, constipation, diarrhea, melanotic stools, BRBPR, or hematemesis  : normal with no hematuria, dysuria  BREAST: See HPI  SKIN: normal with no rash, erythema, bruising, or swelling     Past Medical History:   Diagnosis Date    Breast cancer      Past Surgical History:   Procedure Laterality Date    HYSTERECTOMY         Review of patient's allergies indicates:  No Known Allergies  Social History     Socioeconomic History    Marital status:      Spouse name: Not on file    Number of children: Not on file    Years of education: Not on file    Highest education level: Not on file   Social Needs    Financial resource strain: Not on file    Food insecurity - worry: Not on file    Food insecurity - inability: Not on file    Transportation needs - medical: Not on file    Transportation needs - non-medical: Not on file   Occupational  History    Not on file   Tobacco Use    Smoking status: Former Smoker     Last attempt to quit: 2016     Years since quittin.4    Smokeless tobacco: Never Used   Substance and Sexual Activity    Alcohol use: No    Drug use: No    Sexual activity: Not on file   Other Topics Concern    Not on file   Social History Narrative    Not on file         Current Outpatient Medications:     anastrozole (ARIMIDEX) 1 mg Tab, TAKE 1 TABLET(1 MG) BY MOUTH EVERY DAY., Disp: 30 tablet, Rfl: 12    anastrozole (ARIMIDEX) 1 mg Tab, TAKE 1 TABLET(1 MG) BY MOUTH EVERY DAY., Disp: 30 tablet, Rfl: 6    lactulose (CHRONULAC) 10 gram/15 mL solution, TAKE 2 TABLESPOONSFUL TWICE DAILY, Disp: 500 mL, Rfl: 1    multivitamin (ONE DAILY MULTIVITAMIN) per tablet, Take 1 tablet by mouth once daily., Disp: , Rfl:     venlafaxine (EFFEXOR-XR) 37.5 MG 24 hr capsule, TAKE 1 CAPSULE(37.5 MG) BY MOUTH TWICE DAILY, Disp: 60 capsule, Rfl: 6    anastrozole (ARIMIDEX) 1 mg Tab, Take 1 tablet (1 mg total) by mouth once daily., Disp: 30 tablet, Rfl: 12    furosemide (LASIX) 20 MG tablet, Take 1 tablet (20 mg total) by mouth once daily., Disp: 30 tablet, Rfl: 12    venlafaxine (EFFEXOR XR) 37.5 MG 24 hr capsule, Take 1 capsule (37.5 mg total) by mouth once daily., Disp: 30 capsule, Rfl: 11          Objective:   Vitals:  Blood pressure 129/86, pulse 69, temperature 97.7 °F (36.5 °C), resp. rate 20, weight 74.5 kg (164 lb 4.8 oz).    Physical Examination:   GEN: no apparent distress, comfortable  HEAD: atraumatic and normocephalic  EYES: no pallor, no icterus  ENT:  no pharyngeal erythema  NECK: no masses, thyroid normal, no LAD/LN's, supple  CV: RRR with no murmur; normal pulse  CHEST: Normal respiratory effort; CTAB; normal breath sounds; no wheeze or crackles  ABDOM: nontender and nondistended; soft;  no rebound/guarding  MUSC/Skeletal: ROM normal; no crepitus; joints normal  EXTREM: no clubbing, cyanosis, inflammation or  swelling  SKIN: negative exam  : no escobar  NEURO: grossly intact; motor/sensory WNL;  no tremors  PSYCH: normal mood, affect and behavior  LYMPH: normal cervical, supraclavicular, axillary and groin LN's  BREASTS:L & R breast NT without palpable mass    Assessment:   (1) 58 y.o. female with diagnosis of Stage 1 Right breast cancer, on adjuvant arimidex daily.  RTC 6 months with mammogram.    (2)on effexor for HF sx.  RTC 6 months.    (3) constipation sx, chronulac syrup trial, refer to Dr. Kline for colonoscopy.      1. Malignant neoplasm of overlapping sites of right breast in female, estrogen receptor positive               Malignant neoplasm of overlapping sites of right breast in female, estrogen receptor positive  -     Ambulatory referral to Gastroenterology  -     anastrozole (ARIMIDEX) 1 mg Tab; Take 1 tablet (1 mg total) by mouth once daily.  Dispense: 30 tablet; Refill: 12  -     venlafaxine (EFFEXOR XR) 37.5 MG 24 hr capsule; Take 1 capsule (37.5 mg total) by mouth once daily.  Dispense: 30 capsule; Refill: 11  -     anastrozole (ARIMIDEX) 1 mg Tab; TAKE 1 TABLET(1 MG) BY MOUTH EVERY DAY.  Dispense: 30 tablet; Refill: 12  -     anastrozole (ARIMIDEX) 1 mg Tab; TAKE 1 TABLET(1 MG) BY MOUTH EVERY DAY.  Dispense: 30 tablet; Refill: 6      Follow-up in about 6 months (around 7/2/2019) for follow up on cancer status.

## 2019-03-28 ENCOUNTER — TELEPHONE (OUTPATIENT)
Dept: HEMATOLOGY/ONCOLOGY | Facility: CLINIC | Age: 59
End: 2019-03-28

## 2019-03-28 NOTE — TELEPHONE ENCOUNTER
----- Message from Archana Forman sent at 3/27/2019 12:14 PM CDT -----  Contact: To, spouse  Pt takes 2 of the Effexor XR capsules by mouth daily.  Rx was written for 30 only.  Please call in new rx to pharmacy.      CB# [phone]

## 2019-04-23 LAB — CRC RECOMMENDATION EXT: NORMAL

## 2019-08-01 ENCOUNTER — OFFICE VISIT (OUTPATIENT)
Dept: HEMATOLOGY/ONCOLOGY | Facility: CLINIC | Age: 59
End: 2019-08-01
Payer: COMMERCIAL

## 2019-08-01 DIAGNOSIS — Z17.0 MALIGNANT NEOPLASM OF OVERLAPPING SITES OF RIGHT BREAST IN FEMALE, ESTROGEN RECEPTOR POSITIVE: Primary | ICD-10-CM

## 2019-08-01 DIAGNOSIS — I83.893 VARICOSE VEINS OF BOTH LEGS WITH EDEMA: ICD-10-CM

## 2019-08-01 DIAGNOSIS — C50.811 MALIGNANT NEOPLASM OF OVERLAPPING SITES OF RIGHT BREAST IN FEMALE, ESTROGEN RECEPTOR POSITIVE: Primary | ICD-10-CM

## 2019-08-01 PROCEDURE — 99214 OFFICE O/P EST MOD 30 MIN: CPT | Mod: S$GLB,,, | Performed by: INTERNAL MEDICINE

## 2019-08-01 PROCEDURE — 99214 PR OFFICE/OUTPT VISIT, EST, LEVL IV, 30-39 MIN: ICD-10-PCS | Mod: S$GLB,,, | Performed by: INTERNAL MEDICINE

## 2019-08-05 NOTE — PROGRESS NOTES
[facility] History & Physical    Subjective:      Patient ID:   Sigrid Edward  59 y.o. female  1960  Hawk Orta, Eliud Shen      Chief Complaint:   Breast cancer follow up    HPI:  59 y.o. female with diagnosis of R breast cancer, Stage 1 dx. 1/11/15. Treated with R partial mastectomy and Rad Rx.  Presently on Arimidex adj. Rx.  HF sx better on Effexor BID.     Referred to Dr. Kline for colonoscopy.Negative results per pt.    Skin cancers removed from face, Dr. Booker.    Oncotype dx 7% recur with adjuvant T or A.  ERP +, PRP +. Her 2 pedro  Neg.    Smoke 1/2 to 1 ppd.  ETOH no.  Allergy no.  Job special .    R partial mastectomy, partial hysterectomy.  M0  Menses onset at 11.  1st child at 21.  No increase joint sx.    Dad COPD. Mom NHL, DM. Recent shingles.    ROS:   GEN: normal without any fever, night sweats or weight loss  HEENT: normal with no HA's, sore throat, stiff neck, changes in vision  CV: normal with no CP, SOB, PND, MEJIA or orthopnea  PULM: normal with no SOB, cough, hemoptysis, sputum or pleuritic pain  GI: normal with no abdominal pain, nausea, vomiting, constipation, diarrhea, melanotic stools, BRBPR, or hematemesis  : normal with no hematuria, dysuria  BREAST: See HPI  SKIN: normal with no rash, erythema, bruising, or swelling     Past Medical History:   Diagnosis Date    Breast cancer      Past Surgical History:   Procedure Laterality Date    HYSTERECTOMY         Review of patient's allergies indicates:  No Known Allergies  Social History     Socioeconomic History    Marital status:      Spouse name: Not on file    Number of children: Not on file    Years of education: Not on file    Highest education level: Not on file   Occupational History    Not on file   Social Needs    Financial resource strain: Not on file    Food insecurity:     Worry: Not on file     Inability: Not on file    Transportation needs:     Medical: Not on file      Non-medical: Not on file   Tobacco Use    Smoking status: Former Smoker     Last attempt to quit: 7/11/2016     Years since quitting: 3.0    Smokeless tobacco: Never Used   Substance and Sexual Activity    Alcohol use: No    Drug use: No    Sexual activity: Not on file   Lifestyle    Physical activity:     Days per week: Not on file     Minutes per session: Not on file    Stress: Not on file   Relationships    Social connections:     Talks on phone: Not on file     Gets together: Not on file     Attends Rastafarian service: Not on file     Active member of club or organization: Not on file     Attends meetings of clubs or organizations: Not on file     Relationship status: Not on file   Other Topics Concern    Not on file   Social History Narrative    Not on file         Current Outpatient Medications:     anastrozole (ARIMIDEX) 1 mg Tab, Take 1 tablet (1 mg total) by mouth once daily., Disp: 30 tablet, Rfl: 12    anastrozole (ARIMIDEX) 1 mg Tab, TAKE 1 TABLET(1 MG) BY MOUTH EVERY DAY., Disp: 30 tablet, Rfl: 12    anastrozole (ARIMIDEX) 1 mg Tab, TAKE 1 TABLET(1 MG) BY MOUTH EVERY DAY., Disp: 30 tablet, Rfl: 6    furosemide (LASIX) 20 MG tablet, Take 1 tablet (20 mg total) by mouth once daily., Disp: 30 tablet, Rfl: 12    lactulose (CHRONULAC) 10 gram/15 mL solution, TAKE 2 TABLESPOONSFUL TWICE DAILY, Disp: 500 mL, Rfl: 1    multivitamin (ONE DAILY MULTIVITAMIN) per tablet, Take 1 tablet by mouth once daily., Disp: , Rfl:     venlafaxine (EFFEXOR XR) 37.5 MG 24 hr capsule, Take 1 capsule (37.5 mg total) by mouth once daily., Disp: 30 capsule, Rfl: 11    venlafaxine (EFFEXOR-XR) 37.5 MG 24 hr capsule, TAKE 1 CAPSULE(37.5 MG) BY MOUTH TWICE DAILY, Disp: 60 capsule, Rfl: 6          Objective:   Vitals:  There were no vitals taken for this visit.    Physical Examination:   GEN: no apparent distress, comfortable  HEAD: atraumatic and normocephalic  EYES: no pallor, no icterus  ENT:  no pharyngeal  erythema  NECK: no masses, thyroid normal, no LAD/LN's, supple  CV: RRR with no murmur; normal pulse  CHEST: Normal respiratory effort; CTAB; normal breath sounds; no wheeze or crackles  ABDOM: nontender and nondistended; soft;  no rebound/guarding  MUSC/Skeletal: ROM normal; no crepitus; joints normal  EXTREM: no clubbing, cyanosis, inflammation or swelling  SKIN: varicose veins noted at legs  : no escobar  NEURO: grossly intact; motor/sensory WNL;  no tremors  PSYCH: normal mood, affect and behavior  LYMPH: normal cervical, supraclavicular, axillary and groin LN's  BREASTS:L & R breast NT without palpable mass    Assessment:   (1) 59 y.o. female with diagnosis of Stage 1 Right breast cancer, on adjuvant arimidex daily.  RTC 6 months with mammogram.    (2)on effexor for HF sx.  RTC 6 months.    (3) Recent skin cancers removed.    (4)Varicose Veins refer to Dr. Echeverria for eval.         Malignant neoplasm of overlapping sites of right breast in female, estrogen receptor positive  -     Mammo Digital Diagnostic Bilat w/ Lonnie; Future; Expected date: 01/07/2020  -     US Breast Bilateral Complete; Future; Expected date: 01/07/2020  -     Ambulatory Referral to Vascular Surgery    Varicose veins of both legs with edema  -     Ambulatory Referral to Vascular Surgery      Follow up in about 26 weeks (around 1/30/2020) for follow up on cancer status.

## 2019-10-09 DIAGNOSIS — R60.0 BILATERAL LEG EDEMA: ICD-10-CM

## 2019-10-09 RX ORDER — FUROSEMIDE 20 MG/1
20 TABLET ORAL DAILY
Qty: 30 TABLET | Refills: 12 | Status: SHIPPED | OUTPATIENT
Start: 2019-10-09 | End: 2019-11-08

## 2019-10-09 RX ORDER — FUROSEMIDE 20 MG/1
20 TABLET ORAL DAILY
Qty: 30 TABLET | Refills: 11 | Status: SHIPPED | OUTPATIENT
Start: 2019-10-09 | End: 2023-02-07

## 2019-12-20 DIAGNOSIS — Z12.31 ENCOUNTER FOR MAMMOGRAM TO ESTABLISH BASELINE MAMMOGRAM: Primary | ICD-10-CM

## 2019-12-20 DIAGNOSIS — M85.88 OTHER SPECIFIED DISORDERS OF BONE DENSITY AND STRUCTURE, OTHER SITE: ICD-10-CM

## 2020-01-06 DIAGNOSIS — Z17.0 MALIGNANT NEOPLASM OF OVERLAPPING SITES OF RIGHT BREAST IN FEMALE, ESTROGEN RECEPTOR POSITIVE: ICD-10-CM

## 2020-01-06 DIAGNOSIS — C50.811 MALIGNANT NEOPLASM OF OVERLAPPING SITES OF RIGHT BREAST IN FEMALE, ESTROGEN RECEPTOR POSITIVE: ICD-10-CM

## 2020-01-06 RX ORDER — ANASTROZOLE 1 MG/1
TABLET ORAL
Qty: 30 TABLET | Refills: 12 | OUTPATIENT
Start: 2020-01-06

## 2020-01-06 NOTE — TELEPHONE ENCOUNTER
----- Message from Tika Espinoza, Patient Care Assistant sent at 2020 11:15 AM CST -----  Patient  called stating that the patient prescription for  anastrozole 1mg has  and she is needing a new prescription. He stated that his wife is at work but he can be reached at ([phone].

## 2020-01-15 ENCOUNTER — HOSPITAL ENCOUNTER (OUTPATIENT)
Dept: RADIOLOGY | Facility: HOSPITAL | Age: 60
Discharge: HOME | End: 2020-01-15
Attending: INTERNAL MEDICINE
Payer: COMMERCIAL

## 2020-01-15 ENCOUNTER — HOSPITAL ENCOUNTER (OUTPATIENT)
Dept: RADIOLOGY | Facility: HOSPITAL | Age: 60
Discharge: HOME | End: 2020-01-15
Attending: OBSTETRICS & GYNECOLOGY
Payer: COMMERCIAL

## 2020-01-15 VITALS — BODY MASS INDEX: 31.01 KG/M2 | HEIGHT: 61 IN | WEIGHT: 164.25 LBS

## 2020-01-15 DIAGNOSIS — C50.811 MALIGNANT NEOPLASM OF OVERLAPPING SITES OF RIGHT BREAST IN FEMALE, ESTROGEN RECEPTOR POSITIVE: ICD-10-CM

## 2020-01-15 DIAGNOSIS — Z17.0 MALIGNANT NEOPLASM OF OVERLAPPING SITES OF RIGHT BREAST IN FEMALE, ESTROGEN RECEPTOR POSITIVE: ICD-10-CM

## 2020-01-15 DIAGNOSIS — M85.88 OTHER SPECIFIED DISORDERS OF BONE DENSITY AND STRUCTURE, OTHER SITE: ICD-10-CM

## 2020-01-15 PROCEDURE — 77080 DXA BONE DENSITY AXIAL: CPT | Mod: TC,PO

## 2020-01-15 PROCEDURE — 77066 DX MAMMO INCL CAD BI: CPT | Mod: TC,PO

## 2020-01-29 ENCOUNTER — OFFICE VISIT (OUTPATIENT)
Dept: HEMATOLOGY/ONCOLOGY | Facility: CLINIC | Age: 60
End: 2020-01-29
Payer: COMMERCIAL

## 2020-01-29 VITALS
WEIGHT: 165 LBS | BODY MASS INDEX: 31.18 KG/M2 | RESPIRATION RATE: 18 BRPM | HEART RATE: 72 BPM | DIASTOLIC BLOOD PRESSURE: 89 MMHG | TEMPERATURE: 98 F | SYSTOLIC BLOOD PRESSURE: 137 MMHG

## 2020-01-29 DIAGNOSIS — Z17.0 MALIGNANT NEOPLASM OF OVERLAPPING SITES OF RIGHT BREAST IN FEMALE, ESTROGEN RECEPTOR POSITIVE: Primary | ICD-10-CM

## 2020-01-29 DIAGNOSIS — C50.811 MALIGNANT NEOPLASM OF OVERLAPPING SITES OF RIGHT BREAST IN FEMALE, ESTROGEN RECEPTOR POSITIVE: Primary | ICD-10-CM

## 2020-01-29 PROCEDURE — 99214 OFFICE O/P EST MOD 30 MIN: CPT | Mod: S$GLB,,, | Performed by: INTERNAL MEDICINE

## 2020-01-29 PROCEDURE — 99214 PR OFFICE/OUTPT VISIT, EST, LEVL IV, 30-39 MIN: ICD-10-PCS | Mod: S$GLB,,, | Performed by: INTERNAL MEDICINE

## 2020-01-29 PROCEDURE — 3008F BODY MASS INDEX DOCD: CPT | Mod: S$GLB,,, | Performed by: INTERNAL MEDICINE

## 2020-01-29 PROCEDURE — 3008F PR BODY MASS INDEX (BMI) DOCUMENTED: ICD-10-PCS | Mod: S$GLB,,, | Performed by: INTERNAL MEDICINE

## 2020-01-30 NOTE — PROGRESS NOTES
[facility] History & Physical    Subjective:      Patient ID:   Sigrid Edward  59 y.o. female  1960  Hawk Orta, Eliud Shen      Chief Complaint:   Breast cancer follow up    HPI:  59 y.o. female with diagnosis of R breast cancer, Stage 1 dx. 1/11/15. Treated with R partial mastectomy and Rad Rx.  Presently on Arimidex adj. Rx.  HF sx better on Effexor BID.     Referred to Dr. Kline for colonoscopy.Negative results per pt.    Skin cancers removed from face, Dr. Booker.    Oncotype dx 7% recur with adjuvant T or A.  ERP +, PRP +. Her 2 pedro  Neg.    Smoke 1/2 to 1 ppd.  ETOH no.  Allergy no.  Job special .    R partial mastectomy, partial hysterectomy.  M0  Menses onset at 11.  1st child at 21.  No increase joint sx.    Dad COPD. Mom NHL, DM. Recent shingles.    ROS:   GEN: normal without any fever, night sweats or weight loss  HEENT: normal with no HA's, sore throat, stiff neck, changes in vision  CV: normal with no CP, SOB, PND, MEJIA or orthopnea  PULM: normal with no SOB, cough, hemoptysis, sputum or pleuritic pain  GI: normal with no abdominal pain, nausea, vomiting, constipation, diarrhea, melanotic stools, BRBPR, or hematemesis  : normal with no hematuria, dysuria  BREAST: See HPI  SKIN: normal with no rash, erythema, bruising, or swelling     Past Medical History:   Diagnosis Date    Breast cancer      Past Surgical History:   Procedure Laterality Date    BREAST LUMPECTOMY      HYSTERECTOMY         Review of patient's allergies indicates:  No Known Allergies        Current Outpatient Medications:     anastrozole (ARIMIDEX) 1 mg Tab, TAKE 1 TABLET(1 MG) BY MOUTH EVERY DAY., Disp: 30 tablet, Rfl: 12    anastrozole (ARIMIDEX) 1 mg Tab, TAKE 1 TABLET(1 MG) BY MOUTH EVERY DAY., Disp: 30 tablet, Rfl: 6    furosemide (LASIX) 20 MG tablet, Take 1 tablet (20 mg total) by mouth once daily., Disp: 30 tablet, Rfl: 11    lactulose (CHRONULAC) 10 gram/15 mL solution, TAKE 2  TABLESPOONSFUL TWICE DAILY, Disp: 500 mL, Rfl: 1    multivitamin (ONE DAILY MULTIVITAMIN) per tablet, Take 1 tablet by mouth once daily., Disp: , Rfl:     venlafaxine (EFFEXOR-XR) 37.5 MG 24 hr capsule, TAKE 1 CAPSULE(37.5 MG) BY MOUTH TWICE DAILY, Disp: 60 capsule, Rfl: 6          Objective:   Vitals:  Blood pressure 137/89, pulse 72, temperature 97.7 °F (36.5 °C), temperature source Oral, resp. rate 18, weight 74.8 kg (165 lb).    Physical Examination:   GEN: no apparent distress, comfortable  HEAD: atraumatic and normocephalic  EYES: no pallor, no icterus  ENT:  no pharyngeal erythema  NECK: no masses, thyroid normal, no LAD/LN's, supple  CV: RRR with no murmur; normal pulse  CHEST: Normal respiratory effort; CTAB; normal breath sounds; no wheeze or crackles  ABDOM: nontender and nondistended; soft;  no rebound/guarding  MUSC/Skeletal: ROM normal; no crepitus; joints normal  EXTREM: no clubbing, cyanosis, inflammation or swelling  SKIN: varicose veins noted at legs  : no escobar  NEURO: grossly intact; motor/sensory WNL;  no tremors  PSYCH: normal mood, affect and behavior  LYMPH: normal cervical, supraclavicular, axillary and groin LN's  BREASTS:L & R breast NT without palpable mass    Assessment:   (1) 59 y.o. female with diagnosis of Stage 1 Right breast cancer, on adjuvant arimidex daily.  RTC 6 months with mammogram.    (2)on effexor for HF sx.  RTC 6 months.    (3) Recent skin cancers removed.       Follow up in about 6 months (around 7/29/2020) for follow up on cancer status.

## 2020-04-26 DIAGNOSIS — R23.2 HOT FLASHES RELATED TO AROMATASE INHIBITOR THERAPY: ICD-10-CM

## 2020-04-26 DIAGNOSIS — T45.1X5A HOT FLASHES RELATED TO AROMATASE INHIBITOR THERAPY: ICD-10-CM

## 2020-04-27 RX ORDER — VENLAFAXINE HYDROCHLORIDE 37.5 MG/1
CAPSULE, EXTENDED RELEASE ORAL
Qty: 60 CAPSULE | Refills: 6 | Status: SHIPPED | OUTPATIENT
Start: 2020-04-27 | End: 2020-11-09

## 2020-09-10 ENCOUNTER — OFFICE VISIT (OUTPATIENT)
Dept: HEMATOLOGY/ONCOLOGY | Facility: CLINIC | Age: 60
End: 2020-09-10
Payer: COMMERCIAL

## 2020-09-10 ENCOUNTER — TELEPHONE (OUTPATIENT)
Dept: HEMATOLOGY/ONCOLOGY | Facility: CLINIC | Age: 60
End: 2020-09-10

## 2020-09-10 VITALS
HEART RATE: 69 BPM | BODY MASS INDEX: 30.99 KG/M2 | WEIGHT: 164 LBS | TEMPERATURE: 99 F | RESPIRATION RATE: 18 BRPM | DIASTOLIC BLOOD PRESSURE: 87 MMHG | SYSTOLIC BLOOD PRESSURE: 133 MMHG

## 2020-09-10 DIAGNOSIS — Z17.0 MALIGNANT NEOPLASM OF OVERLAPPING SITES OF RIGHT BREAST IN FEMALE, ESTROGEN RECEPTOR POSITIVE: Primary | ICD-10-CM

## 2020-09-10 DIAGNOSIS — C50.811 MALIGNANT NEOPLASM OF OVERLAPPING SITES OF RIGHT BREAST IN FEMALE, ESTROGEN RECEPTOR POSITIVE: Primary | ICD-10-CM

## 2020-09-10 PROCEDURE — 99214 OFFICE O/P EST MOD 30 MIN: CPT | Mod: S$GLB,,, | Performed by: INTERNAL MEDICINE

## 2020-09-10 PROCEDURE — 3008F BODY MASS INDEX DOCD: CPT | Mod: S$GLB,,, | Performed by: INTERNAL MEDICINE

## 2020-09-10 PROCEDURE — 3008F PR BODY MASS INDEX (BMI) DOCUMENTED: ICD-10-PCS | Mod: S$GLB,,, | Performed by: INTERNAL MEDICINE

## 2020-09-10 PROCEDURE — 99214 PR OFFICE/OUTPT VISIT, EST, LEVL IV, 30-39 MIN: ICD-10-PCS | Mod: S$GLB,,, | Performed by: INTERNAL MEDICINE

## 2020-09-12 ENCOUNTER — TELEPHONE (OUTPATIENT)
Dept: HEMATOLOGY/ONCOLOGY | Facility: HOSPITAL | Age: 60
End: 2020-09-12

## 2020-09-13 NOTE — PROGRESS NOTES
[facility] Hematology Oncology In Office Follow Up Encounter Progress Note    9/10/20    Subjective:      Patient ID:   Sigrid Edward  60 y.o. female  1960  Hawk Orta, Eliud Shen      Chief Complaint:   Breast cancer follow up    HPI:  60 y.o. female with diagnosis of R breast cancer, Stage 1 dx. 1/11/15. Treated with R partial mastectomy and Rad Rx.  Presently on Arimidex adj. Rx.  HF sx better on Effexor BID.     Referred to Dr. Kline for colonoscopy.Negative results per pt.    Skin cancers removed from face, Dr. Booker.    Oncotype dx 7% recur with adjuvant T or A.  ERP +, PRP +. Her 2 pedro  Neg.    Smoke 1/2 to 1 ppd.  ETOH no.  Allergy no.  Job special .    R partial mastectomy, partial hysterectomy.  M0  Menses onset at 11.  1st child at 21.  No increase joint sx.    Dad COPD. Mom NHL, DM. Recent shingles.    ROS:   GEN: normal without any fever, night sweats or weight loss  HEENT: normal with no HA's, sore throat, stiff neck, changes in vision  CV: normal with no CP, SOB, PND, MEJIA or orthopnea  PULM: normal with no SOB, cough, hemoptysis, sputum or pleuritic pain  GI: normal with no abdominal pain, nausea, vomiting, constipation, diarrhea, melanotic stools, BRBPR, or hematemesis  : normal with no hematuria, dysuria  BREAST: See HPI  SKIN: normal with no rash, erythema, bruising, or swelling     Past Medical History:   Diagnosis Date    Breast cancer      Past Surgical History:   Procedure Laterality Date    BREAST LUMPECTOMY      HYSTERECTOMY         Review of patient's allergies indicates:  No Known Allergies        Current Outpatient Medications:     anastrozole (ARIMIDEX) 1 mg Tab, TAKE 1 TABLET(1 MG) BY MOUTH EVERY DAY., Disp: 30 tablet, Rfl: 12    anastrozole (ARIMIDEX) 1 mg Tab, TAKE 1 TABLET(1 MG) BY MOUTH EVERY DAY., Disp: 30 tablet, Rfl: 6    furosemide (LASIX) 20 MG tablet, Take 1 tablet (20 mg total) by mouth once daily., Disp: 30 tablet, Rfl:  11    lactulose (CHRONULAC) 10 gram/15 mL solution, TAKE 2 TABLESPOONSFUL TWICE DAILY, Disp: 500 mL, Rfl: 1    multivitamin (ONE DAILY MULTIVITAMIN) per tablet, Take 1 tablet by mouth once daily., Disp: , Rfl:     venlafaxine (EFFEXOR-XR) 37.5 MG 24 hr capsule, TAKE ONE CAPSULE BY MOUTH TWICE DAILY, Disp: 60 capsule, Rfl: 6          Objective:   Vitals:  Blood pressure 133/87, pulse 69, temperature 98.6 °F (37 °C), resp. rate 18, weight 74.4 kg (164 lb).    Physical Examination:   GEN: no apparent distress, comfortable  HEAD: atraumatic and normocephalic  EYES: no pallor, no icterus  ENT:  no pharyngeal erythema  NECK: no masses, thyroid normal, no LAD/LN's, supple  CV: RRR with no murmur; normal pulse  CHEST: Normal respiratory effort; CTAB; normal breath sounds; no wheeze or crackles  ABDOM: nontender and nondistended; soft;  no rebound/guarding  MUSC/Skeletal: ROM normal; no crepitus; joints normal  EXTREM: no clubbing, cyanosis, inflammation or swelling  SKIN: varicose veins noted at legs  : no escobar  NEURO: grossly intact; motor/sensory WNL;  no tremors  PSYCH: normal mood, affect and behavior  LYMPH: normal cervical, supraclavicular, axillary and groin LN's  BREASTS:L & R breast NT without palpable mass    Assessment:   (1) 60 y.o. female with diagnosis of Stage 1 Right breast cancer, on adjuvant arimidex daily.  RTC 6 months with mammogram. 1/2021.    (2)on effexor for HF sx.      (3) Check Breast Cancer Index regards 5 vs 8 years of adjuvant Rx.  RTC 6 weeks.

## 2020-11-09 ENCOUNTER — OFFICE VISIT (OUTPATIENT)
Dept: HEMATOLOGY/ONCOLOGY | Facility: CLINIC | Age: 60
End: 2020-11-09
Payer: COMMERCIAL

## 2020-11-09 VITALS
TEMPERATURE: 97 F | HEART RATE: 72 BPM | BODY MASS INDEX: 31.5 KG/M2 | RESPIRATION RATE: 18 BRPM | DIASTOLIC BLOOD PRESSURE: 89 MMHG | SYSTOLIC BLOOD PRESSURE: 136 MMHG | WEIGHT: 166.69 LBS

## 2020-11-09 DIAGNOSIS — R23.2 HOT FLASHES RELATED TO AROMATASE INHIBITOR THERAPY: ICD-10-CM

## 2020-11-09 DIAGNOSIS — T45.1X5A HOT FLASHES RELATED TO AROMATASE INHIBITOR THERAPY: ICD-10-CM

## 2020-11-09 PROCEDURE — 3008F PR BODY MASS INDEX (BMI) DOCUMENTED: ICD-10-PCS | Mod: S$GLB,,, | Performed by: INTERNAL MEDICINE

## 2020-11-09 PROCEDURE — 1126F AMNT PAIN NOTED NONE PRSNT: CPT | Mod: S$GLB,,, | Performed by: INTERNAL MEDICINE

## 2020-11-09 PROCEDURE — 3008F BODY MASS INDEX DOCD: CPT | Mod: S$GLB,,, | Performed by: INTERNAL MEDICINE

## 2020-11-09 PROCEDURE — 1126F PR PAIN SEVERITY QUANTIFIED, NO PAIN PRESENT: ICD-10-PCS | Mod: S$GLB,,, | Performed by: INTERNAL MEDICINE

## 2020-11-09 RX ORDER — VENLAFAXINE 37.5 MG/1
37.5 TABLET ORAL 2 TIMES DAILY
Qty: 60 TABLET | Refills: 11 | Status: SHIPPED | OUTPATIENT
Start: 2020-11-09 | End: 2021-11-17

## 2020-12-07 ENCOUNTER — TELEPHONE (OUTPATIENT)
Dept: HEMATOLOGY/ONCOLOGY | Facility: CLINIC | Age: 60
End: 2020-12-07

## 2020-12-20 ENCOUNTER — TELEPHONE (OUTPATIENT)
Dept: HEMATOLOGY/ONCOLOGY | Facility: CLINIC | Age: 60
End: 2020-12-20

## 2020-12-30 ENCOUNTER — TELEPHONE (OUTPATIENT)
Dept: HEMATOLOGY/ONCOLOGY | Facility: CLINIC | Age: 60
End: 2020-12-30

## 2020-12-30 NOTE — TELEPHONE ENCOUNTER
----- Message from Tika Espinoza, Patient Care Assistant sent at 12/30/2020  9:35 AM CST -----  Patient called in stating she would like to find out if she should keep her Appt. Tomorrow , she was instructed to call in today to see if her test results were in or not . She can be reached at [phone]

## 2020-12-31 ENCOUNTER — OFFICE VISIT (OUTPATIENT)
Dept: HEMATOLOGY/ONCOLOGY | Facility: CLINIC | Age: 60
End: 2020-12-31
Payer: COMMERCIAL

## 2020-12-31 VITALS
HEART RATE: 70 BPM | DIASTOLIC BLOOD PRESSURE: 83 MMHG | WEIGHT: 167 LBS | BODY MASS INDEX: 31.55 KG/M2 | SYSTOLIC BLOOD PRESSURE: 142 MMHG | RESPIRATION RATE: 18 BRPM

## 2020-12-31 DIAGNOSIS — Z17.0 MALIGNANT NEOPLASM OF OVERLAPPING SITES OF RIGHT BREAST IN FEMALE, ESTROGEN RECEPTOR POSITIVE: Primary | ICD-10-CM

## 2020-12-31 DIAGNOSIS — C50.811 MALIGNANT NEOPLASM OF OVERLAPPING SITES OF RIGHT BREAST IN FEMALE, ESTROGEN RECEPTOR POSITIVE: Primary | ICD-10-CM

## 2020-12-31 PROCEDURE — 99213 OFFICE O/P EST LOW 20 MIN: CPT | Mod: S$GLB,,, | Performed by: INTERNAL MEDICINE

## 2020-12-31 PROCEDURE — 99213 PR OFFICE/OUTPT VISIT, EST, LEVL III, 20-29 MIN: ICD-10-PCS | Mod: S$GLB,,, | Performed by: INTERNAL MEDICINE

## 2020-12-31 PROCEDURE — 3008F PR BODY MASS INDEX (BMI) DOCUMENTED: ICD-10-PCS | Mod: S$GLB,,, | Performed by: INTERNAL MEDICINE

## 2020-12-31 PROCEDURE — 1126F PR PAIN SEVERITY QUANTIFIED, NO PAIN PRESENT: ICD-10-PCS | Mod: S$GLB,,, | Performed by: INTERNAL MEDICINE

## 2020-12-31 PROCEDURE — 1126F AMNT PAIN NOTED NONE PRSNT: CPT | Mod: S$GLB,,, | Performed by: INTERNAL MEDICINE

## 2020-12-31 PROCEDURE — 3008F BODY MASS INDEX DOCD: CPT | Mod: S$GLB,,, | Performed by: INTERNAL MEDICINE

## 2021-01-25 ENCOUNTER — OFFICE VISIT (OUTPATIENT)
Dept: FAMILY MEDICINE | Facility: CLINIC | Age: 61
End: 2021-01-25
Payer: COMMERCIAL

## 2021-01-25 VITALS
SYSTOLIC BLOOD PRESSURE: 110 MMHG | WEIGHT: 167 LBS | HEART RATE: 60 BPM | BODY MASS INDEX: 31.53 KG/M2 | DIASTOLIC BLOOD PRESSURE: 80 MMHG | HEIGHT: 61 IN | OXYGEN SATURATION: 96 %

## 2021-01-25 DIAGNOSIS — R43.9 PROBLEMS WITH SMELL AND TASTE: ICD-10-CM

## 2021-01-25 DIAGNOSIS — R51.9 ACUTE NONINTRACTABLE HEADACHE, UNSPECIFIED HEADACHE TYPE: ICD-10-CM

## 2021-01-25 DIAGNOSIS — Z20.822 SUSPECTED COVID-19 VIRUS INFECTION: Primary | ICD-10-CM

## 2021-01-25 DIAGNOSIS — R05.9 COUGH: ICD-10-CM

## 2021-01-25 PROCEDURE — 99213 PR OFFICE/OUTPT VISIT, EST, LEVL III, 20-29 MIN: ICD-10-PCS | Mod: S$GLB,,, | Performed by: NURSE PRACTITIONER

## 2021-01-25 PROCEDURE — U0003 INFECTIOUS AGENT DETECTION BY NUCLEIC ACID (DNA OR RNA); SEVERE ACUTE RESPIRATORY SYNDROME CORONAVIRUS 2 (SARS-COV-2) (CORONAVIRUS DISEASE [COVID-19]), AMPLIFIED PROBE TECHNIQUE, MAKING USE OF HIGH THROUGHPUT TECHNOLOGIES AS DESCRIBED BY CMS-2020-01-R: HCPCS

## 2021-01-25 PROCEDURE — 3008F BODY MASS INDEX DOCD: CPT | Mod: S$GLB,,, | Performed by: NURSE PRACTITIONER

## 2021-01-25 PROCEDURE — 99213 OFFICE O/P EST LOW 20 MIN: CPT | Mod: S$GLB,,, | Performed by: NURSE PRACTITIONER

## 2021-01-25 PROCEDURE — 3008F PR BODY MASS INDEX (BMI) DOCUMENTED: ICD-10-PCS | Mod: S$GLB,,, | Performed by: NURSE PRACTITIONER

## 2021-01-26 ENCOUNTER — TELEPHONE (OUTPATIENT)
Dept: FAMILY MEDICINE | Facility: CLINIC | Age: 61
End: 2021-01-26

## 2021-01-26 LAB — SARS-COV-2 RNA RESP QL NAA+PROBE: DETECTED

## 2021-01-27 ENCOUNTER — TELEPHONE (OUTPATIENT)
Dept: FAMILY MEDICINE | Facility: CLINIC | Age: 61
End: 2021-01-27

## 2021-01-27 DIAGNOSIS — U07.1 COVID-19 VIRUS DETECTED: ICD-10-CM

## 2021-01-29 ENCOUNTER — TELEPHONE (OUTPATIENT)
Dept: HEMATOLOGY/ONCOLOGY | Facility: CLINIC | Age: 61
End: 2021-01-29

## 2021-01-29 DIAGNOSIS — Z12.31 SCREENING MAMMOGRAM FOR HIGH-RISK PATIENT: Primary | ICD-10-CM

## 2021-02-12 ENCOUNTER — HOSPITAL ENCOUNTER (OUTPATIENT)
Dept: RADIOLOGY | Facility: HOSPITAL | Age: 61
Discharge: HOME | End: 2021-02-12
Attending: INTERNAL MEDICINE
Payer: COMMERCIAL

## 2021-02-12 VITALS — BODY MASS INDEX: 31.55 KG/M2 | WEIGHT: 167.13 LBS | HEIGHT: 61 IN

## 2021-02-12 DIAGNOSIS — Z12.31 SCREENING MAMMOGRAM FOR HIGH-RISK PATIENT: ICD-10-CM

## 2021-02-12 PROCEDURE — 77067 SCR MAMMO BI INCL CAD: CPT | Mod: TC,PO

## 2021-05-19 ENCOUNTER — TELEPHONE (OUTPATIENT)
Dept: HEMATOLOGY/ONCOLOGY | Facility: CLINIC | Age: 61
End: 2021-05-19

## 2021-06-30 ENCOUNTER — OFFICE VISIT (OUTPATIENT)
Dept: HEMATOLOGY/ONCOLOGY | Facility: CLINIC | Age: 61
End: 2021-06-30
Payer: COMMERCIAL

## 2021-06-30 VITALS
HEART RATE: 85 BPM | TEMPERATURE: 97 F | WEIGHT: 170 LBS | BODY MASS INDEX: 32.12 KG/M2 | DIASTOLIC BLOOD PRESSURE: 89 MMHG | SYSTOLIC BLOOD PRESSURE: 119 MMHG

## 2021-06-30 DIAGNOSIS — Z17.0 MALIGNANT NEOPLASM OF OVERLAPPING SITES OF RIGHT BREAST IN FEMALE, ESTROGEN RECEPTOR POSITIVE: Primary | ICD-10-CM

## 2021-06-30 DIAGNOSIS — C50.811 MALIGNANT NEOPLASM OF OVERLAPPING SITES OF RIGHT BREAST IN FEMALE, ESTROGEN RECEPTOR POSITIVE: Primary | ICD-10-CM

## 2021-06-30 PROCEDURE — 1126F AMNT PAIN NOTED NONE PRSNT: CPT | Mod: S$GLB,,, | Performed by: INTERNAL MEDICINE

## 2021-06-30 PROCEDURE — 3008F BODY MASS INDEX DOCD: CPT | Mod: S$GLB,,, | Performed by: INTERNAL MEDICINE

## 2021-06-30 PROCEDURE — 3008F PR BODY MASS INDEX (BMI) DOCUMENTED: ICD-10-PCS | Mod: S$GLB,,, | Performed by: INTERNAL MEDICINE

## 2021-06-30 PROCEDURE — 99214 PR OFFICE/OUTPT VISIT, EST, LEVL IV, 30-39 MIN: ICD-10-PCS | Mod: S$GLB,,, | Performed by: INTERNAL MEDICINE

## 2021-06-30 PROCEDURE — 1126F PR PAIN SEVERITY QUANTIFIED, NO PAIN PRESENT: ICD-10-PCS | Mod: S$GLB,,, | Performed by: INTERNAL MEDICINE

## 2021-06-30 PROCEDURE — 99214 OFFICE O/P EST MOD 30 MIN: CPT | Mod: S$GLB,,, | Performed by: INTERNAL MEDICINE

## 2022-01-26 ENCOUNTER — TELEPHONE (OUTPATIENT)
Dept: FAMILY MEDICINE | Facility: CLINIC | Age: 62
End: 2022-01-26
Payer: COMMERCIAL

## 2022-01-26 ENCOUNTER — TELEPHONE (OUTPATIENT)
Dept: HEMATOLOGY/ONCOLOGY | Facility: CLINIC | Age: 62
End: 2022-01-26
Payer: COMMERCIAL

## 2022-01-26 DIAGNOSIS — Z12.31 SCREENING MAMMOGRAM FOR HIGH-RISK PATIENT: Primary | ICD-10-CM

## 2022-01-26 DIAGNOSIS — K64.9 HEMORRHOIDS, UNSPECIFIED HEMORRHOID TYPE: Primary | ICD-10-CM

## 2022-01-26 NOTE — TELEPHONE ENCOUNTER
Spoke to pt. 3 weeks ago pt had a hemorrhoid and now it is purple and bleeding. Pt has scheduled appt 2/7/22. Pt states she takes stool softeners and laxatives, but she is still battling with constipation.

## 2022-01-26 NOTE — TELEPHONE ENCOUNTER
----- Message from Tika Espinoza, Patient Care Assistant sent at 1/26/2022  9:25 AM CST -----  Patient called in stating she would like her Mammo orders sent to [facility]. # [phone]

## 2022-01-26 NOTE — TELEPHONE ENCOUNTER
----- Message from Deepa Katarzyna sent at 1/26/2022 10:45 AM CST -----  Pt calling said she needs to speak to a nurse  # [phone]

## 2022-01-27 ENCOUNTER — TELEPHONE (OUTPATIENT)
Dept: FAMILY MEDICINE | Facility: CLINIC | Age: 62
End: 2022-01-27
Payer: COMMERCIAL

## 2022-01-27 DIAGNOSIS — K64.9 HEMORRHOIDS, UNSPECIFIED HEMORRHOID TYPE: Primary | ICD-10-CM

## 2022-01-27 RX ORDER — HYDROCORTISONE 25 MG/G
CREAM TOPICAL 2 TIMES DAILY
Qty: 20 G | Refills: 1 | Status: SHIPPED | OUTPATIENT
Start: 2022-01-27 | End: 2024-02-20

## 2022-01-27 NOTE — TELEPHONE ENCOUNTER
----- Message from Silvia Mcginnis sent at 1/27/2022  8:47 AM CST -----  Patient called and stated that she did not receive a call back from the nurse to say if she was going to have anything called in please give her a call at [phone]

## 2022-01-27 NOTE — TELEPHONE ENCOUNTER
----- Message from Kamilla Blakely MA sent at 1/27/2022  3:34 PM CST -----    ----- Message -----  From: Macey Conklin  Sent: 1/27/2022   3:32 PM CST  To: Jeremias Coates Staff    PRIOR AUTHORIZATION

## 2022-01-31 ENCOUNTER — TELEPHONE (OUTPATIENT)
Dept: FAMILY MEDICINE | Facility: CLINIC | Age: 62
End: 2022-01-31
Payer: COMMERCIAL

## 2022-01-31 DIAGNOSIS — Z79.899 ENCOUNTER FOR LONG-TERM (CURRENT) USE OF OTHER MEDICATIONS: Primary | ICD-10-CM

## 2022-01-31 DIAGNOSIS — Z00.00 ROUTINE GENERAL MEDICAL EXAMINATION AT A HEALTH CARE FACILITY: ICD-10-CM

## 2022-02-04 LAB
ALBUMIN SERPL-MCNC: 4.1 G/DL (ref 3.6–5.1)
ALBUMIN/GLOB SERPL: 1.6 (CALC) (ref 1–2.5)
ALP SERPL-CCNC: 74 U/L (ref 37–153)
ALT SERPL-CCNC: 10 U/L (ref 6–29)
APPEARANCE UR: CLEAR
AST SERPL-CCNC: 11 U/L (ref 10–35)
BACTERIA #/AREA URNS HPF: ABNORMAL /HPF
BACTERIA UR CULT: ABNORMAL
BASOPHILS # BLD AUTO: 29 CELLS/UL (ref 0–200)
BASOPHILS NFR BLD AUTO: 0.4 %
BILIRUB SERPL-MCNC: 0.4 MG/DL (ref 0.2–1.2)
BILIRUB UR QL STRIP: NEGATIVE
BUN SERPL-MCNC: 12 MG/DL (ref 7–25)
BUN/CREAT SERPL: NORMAL (CALC) (ref 6–22)
CALCIUM SERPL-MCNC: 9.4 MG/DL (ref 8.6–10.4)
CHLORIDE SERPL-SCNC: 106 MMOL/L (ref 98–110)
CHOLEST SERPL-MCNC: 207 MG/DL
CHOLEST/HDLC SERPL: 3.3 (CALC)
CO2 SERPL-SCNC: 27 MMOL/L (ref 20–32)
COLOR UR: YELLOW
CREAT SERPL-MCNC: 0.92 MG/DL (ref 0.5–0.99)
EOSINOPHIL # BLD AUTO: 180 CELLS/UL (ref 15–500)
EOSINOPHIL NFR BLD AUTO: 2.5 %
ERYTHROCYTE [DISTWIDTH] IN BLOOD BY AUTOMATED COUNT: 12.3 % (ref 11–15)
GLOBULIN SER CALC-MCNC: 2.5 G/DL (CALC) (ref 1.9–3.7)
GLUCOSE SERPL-MCNC: 83 MG/DL (ref 65–99)
GLUCOSE UR QL STRIP: NEGATIVE
HCT VFR BLD AUTO: 41.2 % (ref 35–45)
HDLC SERPL-MCNC: 63 MG/DL
HGB BLD-MCNC: 14 G/DL (ref 11.7–15.5)
HGB UR QL STRIP: NEGATIVE
HYALINE CASTS #/AREA URNS LPF: ABNORMAL /LPF
KETONES UR QL STRIP: NEGATIVE
LDLC SERPL CALC-MCNC: 125 MG/DL (CALC)
LEUKOCYTE ESTERASE UR QL STRIP: NEGATIVE
LYMPHOCYTES # BLD AUTO: 2167 CELLS/UL (ref 850–3900)
LYMPHOCYTES NFR BLD AUTO: 30.1 %
MCH RBC QN AUTO: 30.7 PG (ref 27–33)
MCHC RBC AUTO-ENTMCNC: 34 G/DL (ref 32–36)
MCV RBC AUTO: 90.4 FL (ref 80–100)
MONOCYTES # BLD AUTO: 439 CELLS/UL (ref 200–950)
MONOCYTES NFR BLD AUTO: 6.1 %
NEUTROPHILS # BLD AUTO: 4385 CELLS/UL (ref 1500–7800)
NEUTROPHILS NFR BLD AUTO: 60.9 %
NITRITE UR QL STRIP: NEGATIVE
NONHDLC SERPL-MCNC: 144 MG/DL (CALC)
PH UR STRIP: 7.5 [PH] (ref 5–8)
PLATELET # BLD AUTO: 302 THOUSAND/UL (ref 140–400)
PMV BLD REES-ECKER: 9.9 FL (ref 7.5–12.5)
POTASSIUM SERPL-SCNC: 4.3 MMOL/L (ref 3.5–5.3)
PROT SERPL-MCNC: 6.6 G/DL (ref 6.1–8.1)
PROT UR QL STRIP: ABNORMAL
RBC # BLD AUTO: 4.56 MILLION/UL (ref 3.8–5.1)
RBC #/AREA URNS HPF: ABNORMAL /HPF
SODIUM SERPL-SCNC: 140 MMOL/L (ref 135–146)
SP GR UR STRIP: 1.02 (ref 1–1.03)
SQUAMOUS #/AREA URNS HPF: ABNORMAL /HPF
TRIGL SERPL-MCNC: 91 MG/DL
TSH SERPL-ACNC: 2.34 MIU/L (ref 0.4–4.5)
WBC # BLD AUTO: 7.2 THOUSAND/UL (ref 3.8–10.8)
WBC #/AREA URNS HPF: ABNORMAL /HPF

## 2022-02-07 ENCOUNTER — OFFICE VISIT (OUTPATIENT)
Dept: FAMILY MEDICINE | Facility: CLINIC | Age: 62
End: 2022-02-07
Payer: COMMERCIAL

## 2022-02-07 VITALS
BODY MASS INDEX: 31.72 KG/M2 | WEIGHT: 168 LBS | SYSTOLIC BLOOD PRESSURE: 118 MMHG | HEART RATE: 80 BPM | HEIGHT: 61 IN | OXYGEN SATURATION: 100 % | DIASTOLIC BLOOD PRESSURE: 80 MMHG

## 2022-02-07 DIAGNOSIS — K64.9 HEMORRHOIDS, UNSPECIFIED HEMORRHOID TYPE: ICD-10-CM

## 2022-02-07 DIAGNOSIS — Z00.00 ROUTINE GENERAL MEDICAL EXAMINATION AT A HEALTH CARE FACILITY: Primary | ICD-10-CM

## 2022-02-07 DIAGNOSIS — Z85.3 HISTORY OF BREAST CANCER: ICD-10-CM

## 2022-02-07 PROCEDURE — 3008F PR BODY MASS INDEX (BMI) DOCUMENTED: ICD-10-PCS | Mod: S$GLB,,, | Performed by: NURSE PRACTITIONER

## 2022-02-07 PROCEDURE — 3079F PR MOST RECENT DIASTOLIC BLOOD PRESSURE 80-89 MM HG: ICD-10-PCS | Mod: S$GLB,,, | Performed by: NURSE PRACTITIONER

## 2022-02-07 PROCEDURE — 3074F PR MOST RECENT SYSTOLIC BLOOD PRESSURE < 130 MM HG: ICD-10-PCS | Mod: S$GLB,,, | Performed by: NURSE PRACTITIONER

## 2022-02-07 PROCEDURE — 3074F SYST BP LT 130 MM HG: CPT | Mod: S$GLB,,, | Performed by: NURSE PRACTITIONER

## 2022-02-07 PROCEDURE — 3079F DIAST BP 80-89 MM HG: CPT | Mod: S$GLB,,, | Performed by: NURSE PRACTITIONER

## 2022-02-07 PROCEDURE — 1160F RVW MEDS BY RX/DR IN RCRD: CPT | Mod: S$GLB,,, | Performed by: NURSE PRACTITIONER

## 2022-02-07 PROCEDURE — 99396 PR PREVENTIVE VISIT,EST,40-64: ICD-10-PCS | Mod: S$GLB,,, | Performed by: NURSE PRACTITIONER

## 2022-02-07 PROCEDURE — 3008F BODY MASS INDEX DOCD: CPT | Mod: S$GLB,,, | Performed by: NURSE PRACTITIONER

## 2022-02-07 PROCEDURE — 1160F PR REVIEW ALL MEDS BY PRESCRIBER/CLIN PHARMACIST DOCUMENTED: ICD-10-PCS | Mod: S$GLB,,, | Performed by: NURSE PRACTITIONER

## 2022-02-07 PROCEDURE — 99396 PREV VISIT EST AGE 40-64: CPT | Mod: S$GLB,,, | Performed by: NURSE PRACTITIONER

## 2022-02-07 NOTE — PROGRESS NOTES
SUBJECTIVE:    Patient ID: Sigrid Edward is a 61 y.o. female.    Chief Complaint: Annual Exam (Annual wellness exam//brought med bottles//no refills needed per pt//mammogram is scheduled for 2/14/22//tc)    Pt presents for routine wellness exam. Her medical history is minimal but she does have significant history of breast cancer. She follows up with Dr. Berg every 6 months and has graduated to regular mammograms annually. She was put on Effexor to deal with hot flashes a few years ago after her surgery. Plans to ask Dr. Berg about getting off of this medication.   Called last week d/t dealing with a bleeding hemorrhoid. She was given a prescription for it. Followed up with her GI who advised they would like to repeat her colonoscopy d/t the bleeding.   Sees Dr. Shen with Gynecology annually and will see later this month.   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  Had blood work completed prior to visit. Declines flu vaccine.           Telephone on 01/31/2022   Component Date Value Ref Range Status    WBC 02/03/2022 7.2  3.8 - 10.8 Thousand/uL Final    RBC 02/03/2022 4.56  3.80 - 5.10 Million/uL Final    Hemoglobin 02/03/2022 14.0  11.7 - 15.5 g/dL Final    Hematocrit 02/03/2022 41.2  35.0 - 45.0 % Final    MCV 02/03/2022 90.4  80.0 - 100.0 fL Final    MCH 02/03/2022 30.7  27.0 - 33.0 pg Final    MCHC 02/03/2022 34.0  32.0 - 36.0 g/dL Final    RDW 02/03/2022 12.3  11.0 - 15.0 % Final    Platelets 02/03/2022 302  140 - 400 Thousand/uL Final    MPV 02/03/2022 9.9  7.5 - 12.5 fL Final    Neutrophils, Abs 02/03/2022 4,385  1,500 - 7,800 cells/uL Final    Lymph # 02/03/2022 2,167  850 - 3,900 cells/uL Final    Mono # 02/03/2022 439  200 - 950 cells/uL Final    Eos # 02/03/2022 180  15 - 500 cells/uL Final    Baso # 02/03/2022 29  0 - 200 cells/uL Final    Neutrophils Relative 02/03/2022 60.9  % Final    Lymph  % 02/03/2022 30.1  % Final    Mono % 02/03/2022 6.1  % Final    Eosinophil % 02/03/2022 2.5  % Final    Basophil % 02/03/2022 0.4  % Final    Glucose 02/03/2022 83  65 - 99 mg/dL Final    BUN 02/03/2022 12  7 - 25 mg/dL Final    Creatinine 02/03/2022 0.92  0.50 - 0.99 mg/dL Final    eGFR if non African American 02/03/2022 66  > OR = 60 mL/min/1.73m2 Final    eGFR if  02/03/2022 77  > OR = 60 mL/min/1.73m2 Final    BUN/Creatinine Ratio 02/03/2022 NOT APPLICABLE  6 - 22 (calc) Final    Sodium 02/03/2022 140  135 - 146 mmol/L Final    Potassium 02/03/2022 4.3  3.5 - 5.3 mmol/L Final    Chloride 02/03/2022 106  98 - 110 mmol/L Final    CO2 02/03/2022 27  20 - 32 mmol/L Final    Calcium 02/03/2022 9.4  8.6 - 10.4 mg/dL Final    Total Protein 02/03/2022 6.6  6.1 - 8.1 g/dL Final    Albumin 02/03/2022 4.1  3.6 - 5.1 g/dL Final    Globulin, Total 02/03/2022 2.5  1.9 - 3.7 g/dL (calc) Final    Albumin/Globulin Ratio 02/03/2022 1.6  1.0 - 2.5 (calc) Final    Total Bilirubin 02/03/2022 0.4  0.2 - 1.2 mg/dL Final    Alkaline Phosphatase 02/03/2022 74  37 - 153 U/L Final    AST 02/03/2022 11  10 - 35 U/L Final    ALT 02/03/2022 10  6 - 29 U/L Final    TSH 02/03/2022 2.34  0.40 - 4.50 mIU/L Final    Color, UA 02/03/2022 YELLOW  YELLOW Final    Appearance, UA 02/03/2022 CLEAR  CLEAR Final    Specific Gravity, UA 02/03/2022 1.016  1.001 - 1.035 Final    pH, UA 02/03/2022 7.5  5.0 - 8.0 Final    Glucose, UA 02/03/2022 NEGATIVE  NEGATIVE Final    Bilirubin, UA 02/03/2022 NEGATIVE  NEGATIVE Final    Ketones, UA 02/03/2022 NEGATIVE  NEGATIVE Final    Occult Blood UA 02/03/2022 NEGATIVE  NEGATIVE Final    Protein, UA 02/03/2022 TRACE* NEGATIVE Final    Nitrite, UA 02/03/2022 NEGATIVE  NEGATIVE Final    Leukocytes, UA 02/03/2022 NEGATIVE  NEGATIVE Final    WBC Casts, UA 02/03/2022 NONE SEEN  < OR = 5 /HPF Final    RBC Casts, UA 02/03/2022 0-2  < OR = 2 /HPF Final    Squam Epithel, UA  02/03/2022 0-5  < OR = 5 /HPF Final    Bacteria, UA 02/03/2022 NONE SEEN  NONE SEEN /HPF Final    Hyaline Casts, UA 02/03/2022 NONE SEEN  NONE SEEN /LPF Final    Reflexive Urine Culture 02/03/2022    Final    Cholesterol 02/03/2022 207* <200 mg/dL Final    HDL 02/03/2022 63  > OR = 50 mg/dL Final    Triglycerides 02/03/2022 91  <150 mg/dL Final    LDL Cholesterol 02/03/2022 125* mg/dL (calc) Final    HDL/Cholesterol Ratio 02/03/2022 3.3  <5.0 (calc) Final    Non HDL Chol. (LDL+VLDL) 02/03/2022 144* <130 mg/dL (calc) Final       Past Medical History:   Diagnosis Date    Breast cancer      Past Surgical History:   Procedure Laterality Date    BREAST BIOPSY Right     BREAST LUMPECTOMY      HYSTERECTOMY       Family History   Problem Relation Age of Onset    Lymphoma Mother        Marital Status:   Alcohol History:  reports no history of alcohol use.  Tobacco History:  reports that she quit smoking about 5 years ago. She has never used smokeless tobacco.  Drug History:  reports no history of drug use.    Review of patient's allergies indicates:  No Known Allergies    Current Outpatient Medications:     hydrocortisone 2.5 % cream, Apply topically 2 (two) times daily., Disp: 20 g, Rfl: 1    multivitamin (THERAGRAN) per tablet, Take 1 tablet by mouth once daily., Disp: , Rfl:     venlafaxine (EFFEXOR) 37.5 MG Tab, TAKE 1 TABLET(37.5 MG) BY MOUTH TWICE DAILY, Disp: 60 tablet, Rfl: 11    anastrozole (ARIMIDEX) 1 mg Tab, TAKE 1 TABLET(1 MG) BY MOUTH EVERY DAY. (Patient not taking: Reported on 2/7/2022.), Disp: 30 tablet, Rfl: 12    anastrozole (ARIMIDEX) 1 mg Tab, TAKE 1 TABLET(1 MG) BY MOUTH EVERY DAY. (Patient not taking: Reported on 2/7/2022.), Disp: 30 tablet, Rfl: 6    furosemide (LASIX) 20 MG tablet, Take 1 tablet (20 mg total) by mouth once daily. (Patient not taking: Reported on 2/7/2022), Disp: 30 tablet, Rfl: 11    hydrocortisone-pramoxine (PROCTOFOAM-HS) rectal foam, Place 1 applicator  "rectally 2 (two) times daily. (Patient not taking: Reported on 2/7/2022), Disp: 10 g, Rfl: 1    lactulose (CHRONULAC) 10 gram/15 mL solution, TAKE 2 TABLESPOONSFUL TWICE DAILY (Patient not taking: Reported on 2/7/2022), Disp: 500 mL, Rfl: 1    Review of Systems   Constitutional: Negative for activity change.   HENT: Negative.    Respiratory: Negative for chest tightness and shortness of breath.    Cardiovascular: Negative for chest pain and palpitations.   Gastrointestinal: Positive for constipation.   Genitourinary: Negative.    Musculoskeletal: Positive for arthralgias (hand stiffness).   Neurological: Negative.    Psychiatric/Behavioral: Negative for sleep disturbance.          Objective:      Vitals:    02/07/22 1458   BP: 118/80   Pulse: 80   SpO2: 100%   Weight: 76.2 kg (168 lb)   Height: 5' 1" (1.549 m)     Body mass index is 31.74 kg/m².  Physical Exam  Constitutional:       Appearance: Normal appearance.   HENT:      Head: Normocephalic and atraumatic.      Right Ear: Tympanic membrane normal.      Left Ear: Tympanic membrane normal.      Mouth/Throat:      Mouth: Mucous membranes are moist.      Pharynx: Oropharynx is clear.   Cardiovascular:      Rate and Rhythm: Normal rate and regular rhythm.      Heart sounds: Normal heart sounds.   Pulmonary:      Effort: Pulmonary effort is normal. No respiratory distress.      Breath sounds: Normal breath sounds.   Abdominal:      General: There is no distension.      Tenderness: There is no abdominal tenderness.   Musculoskeletal:         General: Normal range of motion.      Cervical back: Normal range of motion. No tenderness.   Skin:     General: Skin is warm.   Neurological:      Mental Status: She is alert and oriented to person, place, and time.   Psychiatric:         Mood and Affect: Mood normal.           Assessment:       1. Routine general medical examination at a health care facility    2. Hemorrhoids, unspecified hemorrhoid type    3. BMI " 31.0-31.9,adult    4. History of breast cancer         Plan:       Routine general medical examination at a health care facility  - Blood work discussed. Cholesterol is mildly elevated but otherwise all findings are within good range.    Hemorrhoids, unspecified hemorrhoid type  - Workup per GI    BMI 31.0-31.9,adult    History of breast cancer  - Continue follow-up with Dr. Berg    Follow up in about 1 year (around 2/7/2023) for Annual Physical.

## 2022-02-14 ENCOUNTER — HOSPITAL ENCOUNTER (OUTPATIENT)
Dept: RADIOLOGY | Facility: HOSPITAL | Age: 62
Discharge: HOME | End: 2022-02-14
Attending: INTERNAL MEDICINE
Payer: COMMERCIAL

## 2022-02-14 VITALS — HEIGHT: 61 IN | WEIGHT: 168 LBS | BODY MASS INDEX: 31.72 KG/M2

## 2022-02-14 DIAGNOSIS — Z12.31 SCREENING MAMMOGRAM FOR HIGH-RISK PATIENT: ICD-10-CM

## 2022-02-14 PROCEDURE — 77063 BREAST TOMOSYNTHESIS BI: CPT | Mod: TC,PO

## 2022-02-28 ENCOUNTER — OFFICE VISIT (OUTPATIENT)
Dept: HEMATOLOGY/ONCOLOGY | Facility: CLINIC | Age: 62
End: 2022-02-28
Payer: COMMERCIAL

## 2022-02-28 VITALS
SYSTOLIC BLOOD PRESSURE: 120 MMHG | HEART RATE: 80 BPM | TEMPERATURE: 98 F | WEIGHT: 163.38 LBS | BODY MASS INDEX: 30.85 KG/M2 | HEIGHT: 61 IN | RESPIRATION RATE: 18 BRPM | DIASTOLIC BLOOD PRESSURE: 86 MMHG

## 2022-02-28 DIAGNOSIS — T45.1X5A HOT FLASHES RELATED TO AROMATASE INHIBITOR THERAPY: ICD-10-CM

## 2022-02-28 DIAGNOSIS — R23.2 HOT FLASHES RELATED TO AROMATASE INHIBITOR THERAPY: ICD-10-CM

## 2022-02-28 PROCEDURE — 3008F PR BODY MASS INDEX (BMI) DOCUMENTED: ICD-10-PCS | Mod: S$GLB,,, | Performed by: INTERNAL MEDICINE

## 2022-02-28 PROCEDURE — 3074F PR MOST RECENT SYSTOLIC BLOOD PRESSURE < 130 MM HG: ICD-10-PCS | Mod: S$GLB,,, | Performed by: INTERNAL MEDICINE

## 2022-02-28 PROCEDURE — 3079F DIAST BP 80-89 MM HG: CPT | Mod: S$GLB,,, | Performed by: INTERNAL MEDICINE

## 2022-02-28 PROCEDURE — 3079F PR MOST RECENT DIASTOLIC BLOOD PRESSURE 80-89 MM HG: ICD-10-PCS | Mod: S$GLB,,, | Performed by: INTERNAL MEDICINE

## 2022-02-28 PROCEDURE — 3074F SYST BP LT 130 MM HG: CPT | Mod: S$GLB,,, | Performed by: INTERNAL MEDICINE

## 2022-02-28 PROCEDURE — 3008F BODY MASS INDEX DOCD: CPT | Mod: S$GLB,,, | Performed by: INTERNAL MEDICINE

## 2022-02-28 PROCEDURE — 99214 PR OFFICE/OUTPT VISIT, EST, LEVL IV, 30-39 MIN: ICD-10-PCS | Mod: S$GLB,,, | Performed by: INTERNAL MEDICINE

## 2022-02-28 PROCEDURE — 99214 OFFICE O/P EST MOD 30 MIN: CPT | Mod: S$GLB,,, | Performed by: INTERNAL MEDICINE

## 2022-02-28 RX ORDER — VENLAFAXINE 37.5 MG/1
37.5 TABLET ORAL 2 TIMES DAILY
Qty: 60 TABLET | Refills: 11 | Status: SHIPPED | OUTPATIENT
Start: 2022-02-28 | End: 2023-03-28

## 2022-02-28 NOTE — PROGRESS NOTES
[facility] Hematology Oncology In Office Follow Up Encounter Progress Note    22    Subjective:      Patient ID:   Sigrid Edward  61 y.o. female  1960  Hawk Orta, Eliud Shen      Chief Complaint:   Breast cancer follow up    HPI:  61 y.o. female with diagnosis of R breast cancer, Stage 1 dx. 1/11/15. Treated with R partial mastectomy and Rad Rx.  Presently on Arimidex adj. Rx.  HF sx better on Effexor BID.     Referred to Dr. Kline for colonoscopy.Negative results per pt.    Skin cancers removed from face, Dr. Booker.    Oncotype dx 7% recur with adjuvant T or A.  ERP +, PRP +. Her 2 pedro  Neg.    Breast Cancer Index 2% risk of recurrence at years 5-10, after 5 years of adjuvant Rx.  Extended adjuvant endocrine Rx not recommended.  Off  Arimidex now.  Observe for now.  Continue Effexor BID for HF sx.    Mamm 22  Negative [facility].    She had covid, undecided on vaccine.    Smoke 1/2 to 1 ppd.  ETOH no.  Allergy no.  Job special .    R partial mastectomy, partial hysterectomy.  M0  Menses onset at 11.  1st child at 21.  No increase joint sx.    Dad COPD. Mom NHL, DM. Recent shingles.    ROS:   GEN: normal without any fever, night sweats or weight loss  HEENT: normal with no HA's, sore throat, stiff neck, changes in vision  CV: normal with no CP, SOB, PND, MEJIA or orthopnea  PULM: normal with no SOB, cough, hemoptysis, sputum or pleuritic pain  GI: normal with no abdominal pain, nausea, vomiting, constipation, diarrhea, melanotic stools, BRBPR, or hematemesis  : normal with no hematuria, dysuria  BREAST: See HPI  SKIN: normal with no rash, erythema, bruising, or swelling     Past Medical History:   Diagnosis Date    Breast cancer      Past Surgical History:   Procedure Laterality Date    BREAST BIOPSY Right     BREAST LUMPECTOMY      HYSTERECTOMY         Review of patient's allergies indicates:  No Known Allergies        Current Outpatient Medications:  "    hydrocortisone 2.5 % cream, Apply topically 2 (two) times daily., Disp: 20 g, Rfl: 1    multivitamin (THERAGRAN) per tablet, Take 1 tablet by mouth once daily., Disp: , Rfl:     venlafaxine (EFFEXOR) 37.5 MG Tab, Take 1 tablet (37.5 mg total) by mouth 2 (two) times daily., Disp: 60 tablet, Rfl: 11    anastrozole (ARIMIDEX) 1 mg Tab, TAKE 1 TABLET(1 MG) BY MOUTH EVERY DAY. (Patient not taking: No sig reported), Disp: 30 tablet, Rfl: 12    anastrozole (ARIMIDEX) 1 mg Tab, TAKE 1 TABLET(1 MG) BY MOUTH EVERY DAY. (Patient not taking: No sig reported), Disp: 30 tablet, Rfl: 6    furosemide (LASIX) 20 MG tablet, Take 1 tablet (20 mg total) by mouth once daily. (Patient not taking: Reported on 2/7/2022), Disp: 30 tablet, Rfl: 11    hydrocortisone-pramoxine (PROCTOFOAM-HS) rectal foam, Place 1 applicator rectally 2 (two) times daily. (Patient not taking: No sig reported), Disp: 10 g, Rfl: 1    lactulose (CHRONULAC) 10 gram/15 mL solution, TAKE 2 TABLESPOONSFUL TWICE DAILY (Patient not taking: No sig reported), Disp: 500 mL, Rfl: 1          Objective:   Vitals:  Blood pressure 120/86, pulse 80, temperature 97.6 °F (36.4 °C), resp. rate 18, height 5' 1" (1.549 m), weight 74.1 kg (163 lb 6.4 oz).    Physical Examination:   GEN: no apparent distress, comfortable  HEAD: atraumatic and normocephalic  EYES: no pallor, no icterus  ENT:  no pharyngeal erythema  NECK: no masses, thyroid normal, no LAD/LN's, supple  CV: RRR with no murmur; normal pulse  CHEST: Normal respiratory effort; CTAB; normal breath sounds; no wheeze or crackles  ABDOM: nontender and nondistended; soft;  no rebound/guarding  MUSC/Skeletal: ROM normal; no crepitus; joints normal  EXTREM: no clubbing, cyanosis, inflammation or swelling  SKIN: varicose veins noted at legs  : no escobar  NEURO: grossly intact; motor/sensory WNL;  no tremors  PSYCH: normal mood, affect and behavior  LYMPH: normal cervical, supraclavicular, axillary and groin " LN's  BREASTS:L & R breast NT without palpable mass    Assessment:   (1) 61 y.o. female with diagnosis of Stage 1 Right breast cancer, on adjuvant arimidex daily.   Mammogram. 2/2022, negative.    (2)on effexor for HF sx.      (3) Checked  Breast Cancer Index regards 5 vs 8 years of adjuvant Rx.   Results as per HPI.  Off Arimidex now.  Observe for now.  RTC 9 months.

## 2023-01-31 ENCOUNTER — TELEPHONE (OUTPATIENT)
Dept: FAMILY MEDICINE | Facility: CLINIC | Age: 63
End: 2023-01-31

## 2023-01-31 DIAGNOSIS — Z00.00 ROUTINE GENERAL MEDICAL EXAMINATION AT A HEALTH CARE FACILITY: ICD-10-CM

## 2023-01-31 DIAGNOSIS — Z79.899 ENCOUNTER FOR LONG-TERM (CURRENT) USE OF OTHER MEDICATIONS: Primary | ICD-10-CM

## 2023-01-31 NOTE — TELEPHONE ENCOUNTER
----- Message from Silvia Mcginnis sent at 1/31/2023 12:04 PM CST -----  Patient called and stated that she need to have her labs done before her appointment she stated that she will be coming in on Thursday morning please give her a call if any questions [phone]    
Spoke with patient and let her know she can come in any time as long as she is fasting.   
no

## 2023-02-03 LAB
ALBUMIN SERPL-MCNC: 4.2 G/DL (ref 3.6–5.1)
ALBUMIN/GLOB SERPL: 1.5 (CALC) (ref 1–2.5)
ALP SERPL-CCNC: 89 U/L (ref 37–153)
ALT SERPL-CCNC: 12 U/L (ref 6–29)
APPEARANCE UR: CLEAR
AST SERPL-CCNC: 13 U/L (ref 10–35)
BACTERIA #/AREA URNS HPF: ABNORMAL /HPF
BACTERIA UR CULT: ABNORMAL
BASOPHILS # BLD AUTO: 54 CELLS/UL (ref 0–200)
BASOPHILS NFR BLD AUTO: 0.7 %
BILIRUB SERPL-MCNC: 0.4 MG/DL (ref 0.2–1.2)
BILIRUB UR QL STRIP: NEGATIVE
BUN SERPL-MCNC: 18 MG/DL (ref 7–25)
BUN/CREAT SERPL: NORMAL (CALC) (ref 6–22)
CALCIUM SERPL-MCNC: 9.6 MG/DL (ref 8.6–10.4)
CHLORIDE SERPL-SCNC: 107 MMOL/L (ref 98–110)
CHOLEST SERPL-MCNC: 217 MG/DL
CHOLEST/HDLC SERPL: 3 (CALC)
CO2 SERPL-SCNC: 27 MMOL/L (ref 20–32)
COLOR UR: YELLOW
CREAT SERPL-MCNC: 0.99 MG/DL (ref 0.5–1.05)
EGFR: 64 ML/MIN/1.73M2
EOSINOPHIL # BLD AUTO: 177 CELLS/UL (ref 15–500)
EOSINOPHIL NFR BLD AUTO: 2.3 %
ERYTHROCYTE [DISTWIDTH] IN BLOOD BY AUTOMATED COUNT: 12.9 % (ref 11–15)
GLOBULIN SER CALC-MCNC: 2.8 G/DL (CALC) (ref 1.9–3.7)
GLUCOSE SERPL-MCNC: 80 MG/DL (ref 65–99)
GLUCOSE UR QL STRIP: NEGATIVE
HCT VFR BLD AUTO: 42.1 % (ref 35–45)
HDLC SERPL-MCNC: 73 MG/DL
HGB BLD-MCNC: 14.4 G/DL (ref 11.7–15.5)
HGB UR QL STRIP: NEGATIVE
HYALINE CASTS #/AREA URNS LPF: ABNORMAL /LPF
KETONES UR QL STRIP: NEGATIVE
LDLC SERPL CALC-MCNC: 124 MG/DL (CALC)
LEUKOCYTE ESTERASE UR QL STRIP: NEGATIVE
LYMPHOCYTES # BLD AUTO: 2148 CELLS/UL (ref 850–3900)
LYMPHOCYTES NFR BLD AUTO: 27.9 %
MCH RBC QN AUTO: 31.3 PG (ref 27–33)
MCHC RBC AUTO-ENTMCNC: 34.2 G/DL (ref 32–36)
MCV RBC AUTO: 91.5 FL (ref 80–100)
MONOCYTES # BLD AUTO: 462 CELLS/UL (ref 200–950)
MONOCYTES NFR BLD AUTO: 6 %
NEUTROPHILS # BLD AUTO: 4859 CELLS/UL (ref 1500–7800)
NEUTROPHILS NFR BLD AUTO: 63.1 %
NITRITE UR QL STRIP: NEGATIVE
NONHDLC SERPL-MCNC: 144 MG/DL (CALC)
PH UR STRIP: 6 [PH] (ref 5–8)
PLATELET # BLD AUTO: 280 THOUSAND/UL (ref 140–400)
PMV BLD REES-ECKER: 10 FL (ref 7.5–12.5)
POTASSIUM SERPL-SCNC: 4.2 MMOL/L (ref 3.5–5.3)
PROT SERPL-MCNC: 7 G/DL (ref 6.1–8.1)
PROT UR QL STRIP: ABNORMAL
RBC # BLD AUTO: 4.6 MILLION/UL (ref 3.8–5.1)
RBC #/AREA URNS HPF: ABNORMAL /HPF
SERVICE CMNT-IMP: ABNORMAL
SODIUM SERPL-SCNC: 142 MMOL/L (ref 135–146)
SP GR UR STRIP: 1.02 (ref 1–1.03)
SQUAMOUS #/AREA URNS HPF: ABNORMAL /HPF
TRIGL SERPL-MCNC: 97 MG/DL
TSH SERPL-ACNC: 3.62 MIU/L (ref 0.4–4.5)
WBC # BLD AUTO: 7.7 THOUSAND/UL (ref 3.8–10.8)
WBC #/AREA URNS HPF: ABNORMAL /HPF

## 2023-02-05 ENCOUNTER — HOSPITAL ENCOUNTER (EMERGENCY)
Facility: HOSPITAL | Age: 63
Discharge: HOME | End: 2023-02-05
Attending: EMERGENCY MEDICINE
Payer: COMMERCIAL

## 2023-02-05 VITALS
TEMPERATURE: 99 F | WEIGHT: 160 LBS | HEART RATE: 82 BPM | HEIGHT: 61 IN | DIASTOLIC BLOOD PRESSURE: 74 MMHG | RESPIRATION RATE: 16 BRPM | BODY MASS INDEX: 30.21 KG/M2 | SYSTOLIC BLOOD PRESSURE: 132 MMHG | OXYGEN SATURATION: 98 %

## 2023-02-05 DIAGNOSIS — S42.211A CLOSED DISPLACED FRACTURE OF SURGICAL NECK OF RIGHT HUMERUS, UNSPECIFIED FRACTURE MORPHOLOGY, INITIAL ENCOUNTER: Primary | ICD-10-CM

## 2023-02-05 DIAGNOSIS — M25.519 SHOULDER PAIN: ICD-10-CM

## 2023-02-05 DIAGNOSIS — M79.603 ARM PAIN: ICD-10-CM

## 2023-02-05 PROCEDURE — 99284 EMERGENCY DEPT VISIT MOD MDM: CPT | Mod: 25

## 2023-02-05 PROCEDURE — 63600175 PHARM REV CODE 636 W HCPCS: Performed by: NURSE PRACTITIONER

## 2023-02-05 PROCEDURE — 25000003 PHARM REV CODE 250: Performed by: NURSE PRACTITIONER

## 2023-02-05 PROCEDURE — 96374 THER/PROPH/DIAG INJ IV PUSH: CPT

## 2023-02-05 RX ORDER — MORPHINE SULFATE 2 MG/ML
2 INJECTION, SOLUTION INTRAMUSCULAR; INTRAVENOUS
Status: COMPLETED | OUTPATIENT
Start: 2023-02-05 | End: 2023-02-05

## 2023-02-05 RX ORDER — HYDROCODONE BITARTRATE AND ACETAMINOPHEN 5; 325 MG/1; MG/1
1 TABLET ORAL EVERY 6 HOURS PRN
Qty: 12 TABLET | Refills: 0 | Status: SHIPPED | OUTPATIENT
Start: 2023-02-05 | End: 2023-02-07 | Stop reason: SDUPTHER

## 2023-02-05 RX ORDER — ONDANSETRON 4 MG/1
4 TABLET, ORALLY DISINTEGRATING ORAL
Status: COMPLETED | OUTPATIENT
Start: 2023-02-05 | End: 2023-02-05

## 2023-02-05 RX ORDER — HYDROCODONE BITARTRATE AND ACETAMINOPHEN 5; 325 MG/1; MG/1
1 TABLET ORAL
Status: COMPLETED | OUTPATIENT
Start: 2023-02-05 | End: 2023-02-05

## 2023-02-05 RX ORDER — ONDANSETRON 4 MG/1
4 TABLET, FILM COATED ORAL EVERY 6 HOURS PRN
Qty: 12 TABLET | Refills: 0 | Status: SHIPPED | OUTPATIENT
Start: 2023-02-05 | End: 2024-02-20

## 2023-02-05 RX ADMIN — ONDANSETRON 4 MG: 4 TABLET, ORALLY DISINTEGRATING ORAL at 01:02

## 2023-02-05 RX ADMIN — MORPHINE SULFATE 2 MG: 2 INJECTION, SOLUTION INTRAMUSCULAR; INTRAVENOUS at 11:02

## 2023-02-05 RX ADMIN — HYDROCODONE BITARTRATE AND ACETAMINOPHEN 1 TABLET: 5; 325 TABLET ORAL at 01:02

## 2023-02-05 NOTE — ED PROVIDER NOTES
Encounter Date: 2023       History     Chief Complaint   Patient presents with    Shoulder Injury     Pt fell this morning and her right arm or shoulder is hurting her she is unable to lift her arm.      Sigrid Edward is a 62 year old female with history of breast CA presenting to the ED with c/o right shoulder and upper arm pain. She states that she was getting out of bed early this morning and her feet got caught on the sheets. She fell onto a dresser, striking the right side of her chest and right arm. She denies chest pain/SOB but states her pain is located in the right upper arm and that it is difficult to move the arm due to pain.     Review of patient's allergies indicates:  No Known Allergies  Past Medical History:   Diagnosis Date    Breast cancer      Past Surgical History:   Procedure Laterality Date    BREAST BIOPSY Right     BREAST LUMPECTOMY      HYSTERECTOMY       Family History   Problem Relation Age of Onset    Lymphoma Mother      Social History     Tobacco Use    Smoking status: Former     Types: Cigarettes     Quit date: 2016     Years since quittin.5    Smokeless tobacco: Never   Substance Use Topics    Alcohol use: No    Drug use: No     Review of Systems   Constitutional:  Negative for fever.   HENT:  Negative for sore throat.    Respiratory:  Negative for shortness of breath.    Cardiovascular:  Negative for chest pain.   Gastrointestinal:  Negative for nausea.   Genitourinary:  Negative for dysuria.   Musculoskeletal:  Positive for arthralgias (right shoulder, upper arm). Negative for back pain.   Skin:  Negative for rash.   Neurological:  Negative for weakness.   Hematological:  Does not bruise/bleed easily.     Physical Exam     Initial Vitals   BP Pulse Resp Temp SpO2   23 1052 23 1050 23 1050 23 1050 23 1050   131/89 80 18 97.7 °F (36.5 °C) 96 %      MAP       --                Physical Exam    Nursing note and vitals  reviewed.  Constitutional: She appears well-developed and well-nourished. She is not diaphoretic. No distress.   HENT:   Head: Normocephalic and atraumatic.   Mouth/Throat: Oropharynx is clear and moist.   Eyes: Conjunctivae are normal.   Neck: Neck supple.   Cardiovascular:  Normal rate, regular rhythm, normal heart sounds and intact distal pulses.     Exam reveals no gallop and no friction rub.       No murmur heard.  Pulmonary/Chest: Breath sounds normal. No respiratory distress. She has no wheezes. She has no rhonchi. She has no rales. She exhibits tenderness.     Abdominal: Abdomen is soft. She exhibits no distension. There is no abdominal tenderness.   Musculoskeletal:      Right shoulder: Swelling and bony tenderness present. Decreased range of motion (due to pain). Normal pulse (1+ radial).      Cervical back: Neck supple.     Neurological: She is alert and oriented to person, place, and time.   Skin: No rash noted. No erythema.   Psychiatric: Her speech is normal.       ED Course   Procedures  Labs Reviewed - No data to display       Imaging Results              X-Ray Chest AP Portable (In process)                      X-Ray Humerus 2 View Right (Final result)  Result time 02/05/23 12:19:16      Final result by Aureliano Ghotra MD (02/05/23 12:19:16)                   Narrative:    Reason: Right shoulder injury / fall    FINDINGS:  Two views of right humerus show acute comminuted right proximal humerus surgical neck fracture with involvement of the greater tuberosity. Distal fragment is displaced medially 2 mm. No additional fracture or dislocation. Soft tissues are unremarkable.    IMPRESSION:  Acute minimally displaced comminuted proximal right femur surgical neck fracture.    Electronically signed by:  Aureliano Ghotra MD  2/5/2023 12:19 PM [facility] Workstation: [id number]                                     X-Ray Shoulder Trauma Right (Final result)  Result time 02/05/23 12:14:11      Final result by Aureliano Ghotra  MD (02/05/23 12:14:11)                   Narrative:    Reason: Right shoulder injury / pt fell this am on right side    FINDINGS:  Three views of right shoulder show acute minimally displaced right proximal humerus surgical neck fracture with involvement of the greater tuberosity. 2 mm medial displacement of the distal humeral fragment is evident. Slight inferior subluxation of right humeral head with respect to glenoid suggest presence of right glenohumeral joint effusion.    The AC joint is intact. Soft tissues otherwise unremarkable.    IMPRESSION:  Acute minimally displaced right proximal humerus comminuted surgical neck fracture.    Electronically signed by:  Aureliano Ghotra MD  2/5/2023 12:14 PM CST Workstation: [id number]                                     X-Ray Ribs 2 View Right (Final result)  Result time 02/05/23 12:13:06   Procedure changed from XR Ribs Min 3 Views w/PA Chest Right     Final result by Aureliano Ghotra MD (02/05/23 12:13:06)                   Narrative:    Reason: rib pain    FINDINGS:  Four views of right ribs show no acute displaced right rib fracture or destructive osseous lesion.    Comminuted right proximal humerus surgical neck fracture with involvement of the greater tuberosity is partially visualized, minimally displaced.    Right lung is clear and without pneumothorax.    Rounded radiopacity in right upper quadrant suggests peripherally calcified cholelithiasis.    IMPRESSION:    1. No acute displaced right rib fracture.  2. Partially visualized acute right proximal humerus fracture.    Electronically signed by:  Aureliano Ghotra MD  2/5/2023 12:13 PM CST Workstation: [id number]                                     Medications   morphine injection 2 mg (2 mg Intravenous Given 2/5/23 1129)           APC / Resident Notes:   This is an urgent evaluation of a 62 year old female presenting to the ED with c/o right shoulder pain. I ordered and reviewed xray as well as the radiology report. Xray  shows a minimally displaced proximal right humerus fracture with no rib fracture. She has no pneumothorax or rib xray. She is neurovascularly intact with decreased ROM of the right shoulder due to pain. She was managed in the ED with IV morphine and PO zofran and Norco. She had improvement of pain after medication. She was placed in a shoulder immobilizer and discharged with pain and nausea medication. She will be referred to orthopedics and instructed to return to the ED for any worsening symptoms. Instructed to stay in shoulder immobilizer until seen by ortho. Based on my clinical evaluation, I do not appreciate any immediate, emergent, or life threatening condition or etiology that warrants additional workup today and feel that the patient can be discharged with close follow up care.                      Clinical Impression:   Final diagnoses:  [M25.519] Shoulder pain  [M79.603] Arm pain  [S42.211A] Closed displaced fracture of surgical neck of right humerus, unspecified fracture morphology, initial encounter (Primary)               Ely Ayers NP  02/05/23 2880

## 2023-02-06 ENCOUNTER — TELEPHONE (OUTPATIENT)
Dept: ORTHOPEDICS | Facility: CLINIC | Age: 63
End: 2023-02-06

## 2023-02-06 ENCOUNTER — OFFICE VISIT (OUTPATIENT)
Dept: ORTHOPEDICS | Facility: CLINIC | Age: 63
End: 2023-02-06
Payer: COMMERCIAL

## 2023-02-06 DIAGNOSIS — S42.291A CLOSED 3-PART FRACTURE OF PROXIMAL END OF RIGHT HUMERUS, INITIAL ENCOUNTER: Primary | ICD-10-CM

## 2023-02-06 DIAGNOSIS — S42.211A CLOSED DISPLACED FRACTURE OF SURGICAL NECK OF RIGHT HUMERUS, UNSPECIFIED FRACTURE MORPHOLOGY, INITIAL ENCOUNTER: ICD-10-CM

## 2023-02-06 PROCEDURE — 1160F RVW MEDS BY RX/DR IN RCRD: CPT | Mod: S$GLB,,, | Performed by: ORTHOPAEDIC SURGERY

## 2023-02-06 PROCEDURE — 99204 OFFICE O/P NEW MOD 45 MIN: CPT | Mod: S$GLB,,, | Performed by: ORTHOPAEDIC SURGERY

## 2023-02-06 PROCEDURE — 99204 PR OFFICE/OUTPT VISIT, NEW, LEVL IV, 45-59 MIN: ICD-10-PCS | Mod: S$GLB,,, | Performed by: ORTHOPAEDIC SURGERY

## 2023-02-06 PROCEDURE — 1159F PR MEDICATION LIST DOCUMENTED IN MEDICAL RECORD: ICD-10-PCS | Mod: S$GLB,,, | Performed by: ORTHOPAEDIC SURGERY

## 2023-02-06 PROCEDURE — 1159F MED LIST DOCD IN RCRD: CPT | Mod: S$GLB,,, | Performed by: ORTHOPAEDIC SURGERY

## 2023-02-06 PROCEDURE — 1160F PR REVIEW ALL MEDS BY PRESCRIBER/CLIN PHARMACIST DOCUMENTED: ICD-10-PCS | Mod: S$GLB,,, | Performed by: ORTHOPAEDIC SURGERY

## 2023-02-06 PROCEDURE — 99999 PR PBB SHADOW E&M-EST. PATIENT-LVL II: ICD-10-PCS | Mod: PBBFAC,,, | Performed by: ORTHOPAEDIC SURGERY

## 2023-02-06 PROCEDURE — 99999 PR PBB SHADOW E&M-EST. PATIENT-LVL II: CPT | Mod: PBBFAC,,, | Performed by: ORTHOPAEDIC SURGERY

## 2023-02-06 NOTE — PROGRESS NOTES
Subjective:      Patient ID: Sigrid Edward is a 62 y.o. female.    Chief Complaint: No chief complaint on file.    HPI  62-year-old female with several day history of right shoulder pain.  Sustained accidental blunt trauma during the fall.  Was seen in the emergency department placed in shoulder immobilizer for further evaluation.  Denies any other complaints or prior problems with the arm.  Pain has improved with relative rest and immobilization.  ROS      Objective:    Ortho Exam     Constitutional:   Patient is alert  and oriented in no acute distress  HEENT:  normocephalic atraumatic; PERRL EOMI  Neck:  Supple without adenopathy  Cardiovascular:  Normal rate and rhythm  Pulmonary:  Normal respiratory effort normal chest wall expansion  Abdominal:  Nonprotuberant nondistended  Musculoskeletal:  Patient comes in shoulder immobilizer.  Has good range of motion of her hand.  She has intact skin sensation brisk capillary refill of her digits.  Neurological:  No focal defect; cranial nerves 2-12 grossly intact  Psychiatric/behavioral:  Mood and behavior normal      X-Ray Chest AP Portable  Narrative: EXAMINATION:  XR CHEST AP PORTABLE    CLINICAL HISTORY:  shoulder injury ;    FINDINGS:  Portable chest at 1216 compared 01/05/2015 shows normal cardiomediastinal silhouette.    Lungs are clear. Pulmonary vasculature is normal. No acute osseous abnormality.  Impression: No acute cardiopulmonary abnormality.    Electronically signed by: Aureliano Ghotra MD  Date:    02/05/2023  Time:    12:29  X-Ray Humerus 2 View Right  Reason: Right shoulder injury / fall    FINDINGS:  Two views of right humerus show acute comminuted right proximal humerus surgical neck fracture with involvement of the greater tuberosity. Distal fragment is displaced medially 2 mm. No additional fracture or dislocation. Soft tissues are unremarkable.    IMPRESSION:  Acute minimally displaced comminuted proximal right femur surgical neck  fracture.    Electronically signed by:  Aureliano Ghotra MD  2/5/2023 12:19 PM CST Workstation: [id number]  X-Ray Shoulder Trauma Right  Reason: Right shoulder injury / pt fell this am on right side    FINDINGS:  Three views of right shoulder show acute minimally displaced right proximal humerus surgical neck fracture with involvement of the greater tuberosity. 2 mm medial displacement of the distal humeral fragment is evident. Slight inferior subluxation of right humeral head with respect to glenoid suggest presence of right glenohumeral joint effusion.    The AC joint is intact. Soft tissues otherwise unremarkable.    IMPRESSION:  Acute minimally displaced right proximal humerus comminuted surgical neck fracture.    Electronically signed by:  Aureliano Ghotra MD  2/5/2023 12:14 PM CST Workstation: [id number]  X-Ray Ribs 2 View Right  Reason: rib pain    FINDINGS:  Four views of right ribs show no acute displaced right rib fracture or destructive osseous lesion.    Comminuted right proximal humerus surgical neck fracture with involvement of the greater tuberosity is partially visualized, minimally displaced.    Right lung is clear and without pneumothorax.    Rounded radiopacity in right upper quadrant suggests peripherally calcified cholelithiasis.    IMPRESSION:    1. No acute displaced right rib fracture.  2. Partially visualized acute right proximal humerus fracture.    Electronically signed by:  Aureliano Ghotra MD  2/5/2023 12:13 PM CST Workstation: [id number]       My Findings:    I have personally reviewed radiographs and concur with above findings    Assessment:       Encounter Diagnoses   Name Primary?    Closed displaced fracture of surgical neck of right humerus, unspecified fracture morphology, initial encounter     Closed 3-part fracture of proximal end of right humerus, initial encounter Yes         Plan:       I have discussed medical condition treatment options with her and her  at length.  She may  continue with her shoulder immobilizer.  She may remove for bathing changing clothes and pendulum exercises which I have suggested she begin as soon as comfortable.  Otherwise limited use of her arm.  Follow up in 3 weeks for repeat radiographs sooner if any questions or problems.    Past Medical History:   Diagnosis Date    Breast cancer      Past Surgical History:   Procedure Laterality Date    BREAST BIOPSY Right     BREAST LUMPECTOMY      HYSTERECTOMY           Current Outpatient Medications:     anastrozole (ARIMIDEX) 1 mg Tab, TAKE 1 TABLET(1 MG) BY MOUTH EVERY DAY. (Patient not taking: No sig reported), Disp: 30 tablet, Rfl: 12    anastrozole (ARIMIDEX) 1 mg Tab, TAKE 1 TABLET(1 MG) BY MOUTH EVERY DAY. (Patient not taking: No sig reported), Disp: 30 tablet, Rfl: 6    furosemide (LASIX) 20 MG tablet, Take 1 tablet (20 mg total) by mouth once daily. (Patient not taking: Reported on 2/7/2022), Disp: 30 tablet, Rfl: 11    HYDROcodone-acetaminophen (NORCO) 5-325 mg per tablet, Take 1 tablet by mouth every 6 (six) hours as needed for Pain., Disp: 12 tablet, Rfl: 0    hydrocortisone 2.5 % cream, Apply topically 2 (two) times daily., Disp: 20 g, Rfl: 1    hydrocortisone-pramoxine (PROCTOFOAM-HS) rectal foam, Place 1 applicator rectally 2 (two) times daily. (Patient not taking: No sig reported), Disp: 10 g, Rfl: 1    lactulose (CHRONULAC) 10 gram/15 mL solution, TAKE 2 TABLESPOONSFUL TWICE DAILY (Patient not taking: No sig reported), Disp: 500 mL, Rfl: 1    multivitamin (THERAGRAN) per tablet, Take 1 tablet by mouth once daily., Disp: , Rfl:     ondansetron (ZOFRAN) 4 MG tablet, Take 1 tablet (4 mg total) by mouth every 6 (six) hours as needed for Nausea., Disp: 12 tablet, Rfl: 0    venlafaxine (EFFEXOR) 37.5 MG Tab, Take 1 tablet (37.5 mg total) by mouth 2 (two) times daily., Disp: 60 tablet, Rfl: 11    Review of patient's allergies indicates:  No Known Allergies    Family History   Problem Relation Age of Onset     Lymphoma Mother      Social History     Occupational History    Not on file   Tobacco Use    Smoking status: Former     Types: Cigarettes     Quit date: 2016     Years since quittin.5    Smokeless tobacco: Never   Substance and Sexual Activity    Alcohol use: No    Drug use: No    Sexual activity: Not on file

## 2023-02-06 NOTE — TELEPHONE ENCOUNTER
----- Message from Laurie Ornelas MA sent at 2/6/2023 10:35 AM CST -----  Contact:  steffen  ER follow up   FX hip   Call back

## 2023-02-07 ENCOUNTER — TELEPHONE (OUTPATIENT)
Dept: FAMILY MEDICINE | Facility: CLINIC | Age: 63
End: 2023-02-07

## 2023-02-07 ENCOUNTER — OFFICE VISIT (OUTPATIENT)
Dept: FAMILY MEDICINE | Facility: CLINIC | Age: 63
End: 2023-02-07
Payer: COMMERCIAL

## 2023-02-07 VITALS
SYSTOLIC BLOOD PRESSURE: 124 MMHG | WEIGHT: 170 LBS | HEART RATE: 78 BPM | BODY MASS INDEX: 33.38 KG/M2 | DIASTOLIC BLOOD PRESSURE: 78 MMHG | HEIGHT: 60 IN

## 2023-02-07 DIAGNOSIS — E78.2 MIXED HYPERLIPIDEMIA: ICD-10-CM

## 2023-02-07 DIAGNOSIS — C50.811 MALIGNANT NEOPLASM OF OVERLAPPING SITES OF RIGHT BREAST IN FEMALE, ESTROGEN RECEPTOR POSITIVE: ICD-10-CM

## 2023-02-07 DIAGNOSIS — D04.39 SQUAMOUS CELL CARCINOMA IN SITU (SCCIS) OF SKIN OF CHEEK: ICD-10-CM

## 2023-02-07 DIAGNOSIS — Z17.0 MALIGNANT NEOPLASM OF OVERLAPPING SITES OF RIGHT BREAST IN FEMALE, ESTROGEN RECEPTOR POSITIVE: ICD-10-CM

## 2023-02-07 DIAGNOSIS — Z00.00 ANNUAL PHYSICAL EXAM: ICD-10-CM

## 2023-02-07 DIAGNOSIS — S42.411D CLOSED SUPRACONDYLAR FRACTURE OF RIGHT HUMERUS WITH ROUTINE HEALING, SUBSEQUENT ENCOUNTER: Primary | ICD-10-CM

## 2023-02-07 DIAGNOSIS — Z78.0 MENOPAUSE: ICD-10-CM

## 2023-02-07 PROCEDURE — 99396 PR PREVENTIVE VISIT,EST,40-64: ICD-10-PCS | Mod: S$GLB,,, | Performed by: FAMILY MEDICINE

## 2023-02-07 PROCEDURE — 99396 PREV VISIT EST AGE 40-64: CPT | Mod: S$GLB,,, | Performed by: FAMILY MEDICINE

## 2023-02-07 RX ORDER — HYDROCODONE BITARTRATE AND ACETAMINOPHEN 5; 325 MG/1; MG/1
1 TABLET ORAL EVERY 6 HOURS PRN
Qty: 30 TABLET | Refills: 0 | Status: SHIPPED | OUTPATIENT
Start: 2023-02-07 | End: 2024-02-20

## 2023-02-07 NOTE — PROGRESS NOTES
SUBJECTIVE:    Patient ID: Sigrid Edward is a 62 y.o. female.    Chief Complaint: Fall (Pt fell Sunday,  right shoulder and upper arm pain, no bottles, Mammogram ordered, abc )    This 62-year-old female tripped in her bedroom and slammed against a dresser.  She went to the ER with severe right shoulder pain was found to have a nondisplaced right humerus fracture in the proximal head.  She was seen by orthopedics Dr. Watson from [facility].  He placed her in a sling and shoulder immobilizer.  Treated with hydrocodone 5/325 mg q.6 hours p.r.n. and Zofran 4 mg p.r.n. nausea.  She will need to wear the immobilizer 4-6 weeks.    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 Garrido/Dr. Carreon.  This was found to be squamous cell carcinoma per biopsy in 2020 but due to the pandemic, she never went had it removed.  She now has a scaly right cheek lesion, along with some actinic keratoses on the face.    History of breast cancer-follows up with Dr. Lul Berg.    Due for mammogram soon but probably cannot do that with a shoulder immobilizer in place.      Telephone on 01/31/2023   Component Date Value Ref Range Status    WBC 02/02/2023 7.7  3.8 - 10.8 Thousand/uL Final    RBC 02/02/2023 4.60  3.80 - 5.10 Million/uL Final    Hemoglobin 02/02/2023 14.4  11.7 - 15.5 g/dL Final    Hematocrit 02/02/2023 42.1  35.0 - 45.0 % Final    MCV 02/02/2023 91.5  80.0 - 100.0 fL Final    MCH 02/02/2023 31.3  27.0 - 33.0 pg Final    MCHC 02/02/2023 34.2  32.0 - 36.0 g/dL Final    RDW 02/02/2023 12.9  11.0 - 15.0 % Final    Platelets 02/02/2023 280  140 - 400 Thousand/uL Final    MPV 02/02/2023 10.0  7.5 - 12.5 fL Final    Neutrophils, Abs 02/02/2023 4,859  1,500 - 7,800 cells/uL Final    Lymph # 02/02/2023 2,148  850 - 3,900 cells/uL Final    Mono # 02/02/2023 462  200 - 950 cells/uL Final    Eos # 02/02/2023 177  15 - 500 cells/uL Final    Baso # 02/02/2023 54  0 - 200 cells/uL Final    Neutrophils Relative  02/02/2023 63.1  % Final    Lymph % 02/02/2023 27.9  % Final    Mono % 02/02/2023 6.0  % Final    Eosinophil % 02/02/2023 2.3  % Final    Basophil % 02/02/2023 0.7  % Final    Glucose 02/02/2023 80  65 - 99 mg/dL Final    BUN 02/02/2023 18  7 - 25 mg/dL Final    Creatinine 02/02/2023 0.99  0.50 - 1.05 mg/dL Final    eGFR 02/02/2023 64  > OR = 60 mL/min/1.73m2 Final    BUN/Creatinine Ratio 02/02/2023 NOT APPLICABLE  6 - 22 (calc) Final    Sodium 02/02/2023 142  135 - 146 mmol/L Final    Potassium 02/02/2023 4.2  3.5 - 5.3 mmol/L Final    Chloride 02/02/2023 107  98 - 110 mmol/L Final    CO2 02/02/2023 27  20 - 32 mmol/L Final    Calcium 02/02/2023 9.6  8.6 - 10.4 mg/dL Final    Total Protein 02/02/2023 7.0  6.1 - 8.1 g/dL Final    Albumin 02/02/2023 4.2  3.6 - 5.1 g/dL Final    Globulin, Total 02/02/2023 2.8  1.9 - 3.7 g/dL (calc) Final    Albumin/Globulin Ratio 02/02/2023 1.5  1.0 - 2.5 (calc) Final    Total Bilirubin 02/02/2023 0.4  0.2 - 1.2 mg/dL Final    Alkaline Phosphatase 02/02/2023 89  37 - 153 U/L Final    AST 02/02/2023 13  10 - 35 U/L Final    ALT 02/02/2023 12  6 - 29 U/L Final    Cholesterol 02/02/2023 217 (H)  <200 mg/dL Final    HDL 02/02/2023 73  > OR = 50 mg/dL Final    Triglycerides 02/02/2023 97  <150 mg/dL Final    LDL Cholesterol 02/02/2023 124 (H)  mg/dL (calc) Final    HDL/Cholesterol Ratio 02/02/2023 3.0  <5.0 (calc) Final    Non HDL Chol. (LDL+VLDL) 02/02/2023 144 (H)  <130 mg/dL (calc) Final    TSH w/reflex to FT4 02/02/2023 3.62  0.40 - 4.50 mIU/L Final    Color, UA 02/02/2023 YELLOW  YELLOW Final    Appearance, UA 02/02/2023 CLEAR  CLEAR Final    Specific Gravity, UA 02/02/2023 1.016  1.001 - 1.035 Final    pH, UA 02/02/2023 6.0  5.0 - 8.0 Final    Glucose, UA 02/02/2023 NEGATIVE  NEGATIVE Final    Bilirubin, UA 02/02/2023 NEGATIVE  NEGATIVE Final    Ketones, UA 02/02/2023 NEGATIVE  NEGATIVE Final    Occult Blood UA 02/02/2023 NEGATIVE  NEGATIVE Final    Protein, UA 02/02/2023 TRACE (A)   NEGATIVE Final    Nitrite, UA 2023 NEGATIVE  NEGATIVE Final    Leukocytes, UA 2023 NEGATIVE  NEGATIVE Final    WBC Casts, UA 2023 NONE SEEN  < OR = 5 /HPF Final    RBC Casts, UA 2023 0-2  < OR = 2 /HPF Final    Squam Epithel, UA 2023 6-10 (A)  < OR = 5 /HPF Final    Bacteria, UA 2023 NONE SEEN  NONE SEEN /HPF Final    Hyaline Casts, UA 2023 NONE SEEN  NONE SEEN /LPF Final    Service Cmt: 2023    Final    Reflexive Urine Culture 2023    Final       Past Medical History:   Diagnosis Date    Breast cancer      Social History     Socioeconomic History    Marital status:    Tobacco Use    Smoking status: Former     Types: Cigarettes     Quit date: 2016     Years since quittin.5    Smokeless tobacco: Never   Substance and Sexual Activity    Alcohol use: No    Drug use: No     Past Surgical History:   Procedure Laterality Date    BREAST BIOPSY Right     BREAST LUMPECTOMY      HYSTERECTOMY       Family History   Problem Relation Age of Onset    Lymphoma Mother        Review of patient's allergies indicates:  No Known Allergies    Current Outpatient Medications:     hydrocortisone 2.5 % cream, Apply topically 2 (two) times daily., Disp: 20 g, Rfl: 1    multivitamin (THERAGRAN) per tablet, Take 1 tablet by mouth once daily., Disp: , Rfl:     ondansetron (ZOFRAN) 4 MG tablet, Take 1 tablet (4 mg total) by mouth every 6 (six) hours as needed for Nausea., Disp: 12 tablet, Rfl: 0    venlafaxine (EFFEXOR) 37.5 MG Tab, Take 1 tablet (37.5 mg total) by mouth 2 (two) times daily., Disp: 60 tablet, Rfl: 11    HYDROcodone-acetaminophen (NORCO) 5-325 mg per tablet, Take 1 tablet by mouth every 6 (six) hours as needed for Pain., Disp: 30 tablet, Rfl: 0    Review of Systems   Constitutional:  Negative for appetite change, chills, fatigue, fever and unexpected weight change.   HENT:  Negative for congestion, ear pain, sinus pain, sore throat and trouble swallowing.     Eyes:  Negative for pain, discharge and visual disturbance.   Respiratory:  Negative for apnea, cough, shortness of breath and wheezing.    Cardiovascular:  Negative for chest pain, palpitations and leg swelling.   Gastrointestinal:  Negative for abdominal pain, blood in stool, constipation, diarrhea, nausea and vomiting.   Endocrine: Negative for heat intolerance, polydipsia and polyuria.   Genitourinary:  Negative for difficulty urinating, dyspareunia, dysuria, frequency, hematuria and menstrual problem.   Musculoskeletal:  Positive for arthralgias (right shoulder pain from a humerus fracture). Negative for back pain, gait problem, joint swelling and myalgias.   Allergic/Immunologic: Negative for environmental allergies, food allergies and immunocompromised state.   Neurological:  Negative for dizziness, tremors, seizures, numbness and headaches.   Psychiatric/Behavioral:  Negative for behavioral problems, confusion, hallucinations and suicidal ideas. The patient is not nervous/anxious.         Objective:      Vitals:    02/07/23 1120   BP: 124/78   Pulse: 78   Weight: 77.1 kg (170 lb)   Height: 5' (1.524 m)     Physical Exam  Vitals and nursing note reviewed.   Constitutional:       General: She is not in acute distress.     Appearance: She is well-developed. She is obese. She is not toxic-appearing.   HENT:      Head: Normocephalic and atraumatic.      Right Ear: Tympanic membrane and external ear normal.      Left Ear: Tympanic membrane and external ear normal.      Nose: Nose normal.      Mouth/Throat:      Pharynx: Oropharynx is clear.   Eyes:      Pupils: Pupils are equal, round, and reactive to light.   Neck:      Thyroid: No thyromegaly.      Vascular: No carotid bruit.   Cardiovascular:      Rate and Rhythm: Normal rate and regular rhythm.      Heart sounds: Normal heart sounds. No murmur heard.  Pulmonary:      Effort: Pulmonary effort is normal.      Breath sounds: Normal breath sounds. No wheezing or  rales.   Abdominal:      General: Bowel sounds are normal. There is no distension.      Palpations: Abdomen is soft.      Tenderness: There is no abdominal tenderness.   Musculoskeletal:         General: No tenderness or deformity. Normal range of motion.      Cervical back: Normal range of motion and neck supple.      Lumbar back: Normal. No spasms.      Comments: Bends 90 degrees at  waist, right shoulder is ecchymotic and shoulder immobilizer at this time right hand  is strong.  Left shoulder 180 degree flexion, knees are crepitant bilaterally but good range of motion.  No pitting edema to lower extremities.   Lymphadenopathy:      Cervical: No cervical adenopathy.   Skin:     General: Skin is warm and dry.      Findings: No rash.   Neurological:      Mental Status: She is alert and oriented to person, place, and time. Mental status is at baseline.      Cranial Nerves: No cranial nerve deficit.      Motor: No weakness.      Coordination: Coordination normal.      Gait: Gait normal.   Psychiatric:         Mood and Affect: Mood normal.         Behavior: Behavior normal.         Thought Content: Thought content normal.         Judgment: Judgment normal.         Assessment:       1. Closed supracondylar fracture of right humerus with routine healing, subsequent encounter    2. Mixed hyperlipidemia    3. Malignant neoplasm of overlapping sites of right breast in female, estrogen receptor positive    4. Menopause    5. Squamous cell carcinoma in situ (SCCIS) of skin of cheek           Plan:       Closed supracondylar fracture of right humerus with routine healing, subsequent encounter  -     HYDROcodone-acetaminophen (NORCO) 5-325 mg per tablet; Take 1 tablet by mouth every 6 (six) hours as needed for Pain.  Dispense: 30 tablet; Refill: 0  Continue hydrocodone 5/325 p.r.n. pain, shoulder mobilizer 4-6 weeks, follow-up with Dr. Watson at [facility] in 4 weeks.  Mixed hyperlipidemia  -     Lipid Panel;  Future; Expected date: 02/07/2023  Cholesterol elevated 217 HDL 73 TG 97 .  She has been eating quite a bit of potato chips and junk food.  She will tailor her diet to a low carb diet and recheck lipids again in 6 months.  Malignant neoplasm of overlapping sites of right breast in female, estrogen receptor positive  In remission, followed by Dr. Lul Berg  Menopause  Continue Effexor  Squamous cell carcinoma of the cheek, see Dr. Garrido in the next month or 2 to have squamous cell excision  Follow up in about 6 months (around 8/7/2023), or Hyperlipidemia, humerus fracture.        2/7/2023 Jeremias Coates

## 2023-02-07 NOTE — TELEPHONE ENCOUNTER
----- Message from Karin aGrza sent at 2/7/2023  8:08 AM CST -----  Pt called and would like a sooner appt for today other than the 3:20 pm. [phone]

## 2023-02-13 ENCOUNTER — TELEPHONE (OUTPATIENT)
Dept: HEMATOLOGY/ONCOLOGY | Facility: CLINIC | Age: 63
End: 2023-02-13

## 2023-02-13 DIAGNOSIS — Z12.31 SCREENING MAMMOGRAM FOR HIGH-RISK PATIENT: Primary | ICD-10-CM

## 2023-02-13 NOTE — TELEPHONE ENCOUNTER
Patient called and reported that she broke her arm and was also supposed to be getting her mammogram before her next appointment with Dr Berg. Patient requested to have her mammogram scheduled after her orthopedic appointment at the beginning of March. Per Dr Berg's verbal order, order for screening mammogram placed. Scheduling to reach out to schedule patient for after the ortho appointment.    Peripheral Block    Pre-sedation assessment completed: 5/25/2022 11:30 AM    Patient reassessed immediately prior to procedure    Patient location during procedure: holding area  Start time: 5/25/2022 11:30 AM  Stop time: 5/25/2022 11:40 AM  Reason for block: at surgeon's request and post-op pain management  Performed by  Anesthesiologist: Miguel Walker MD  Preanesthetic Checklist  Completed: patient identified, IV checked, site marked, risks and benefits discussed, surgical consent, monitors and equipment checked, pre-op evaluation and timeout performed  Prep:  Pt Position: sitting  Sterile barriers:cap, gloves, gown, mask and sterile barriers  Prep: ChloraPrep  Patient monitoring: blood pressure monitoring, continuous pulse oximetry and EKG  Procedure    Sedation: yes  Performed under: local infiltration  Guidance:ultrasound guided    ULTRASOUND INTERPRETATION.  Using ultrasound guidance a 21 G gauge needle was placed in close proximity to the brachial plexus nerve, at which point, under ultrasound guidance anesthetic was injected in the area of the nerve and spread of the anesthesia was seen on ultrasound in close proximity thereto.  There were no abnormalities seen on ultrasound; a digital image was taken; and the patient tolerated the procedure with no complications. Images:still images obtained    Laterality:right  Block Type:interscalene  Injection Technique:single-shot  Needle Type:echogenic  Needle Gauge:21 G      Medications Used: bupivacaine liposome (EXPAREL) 1.3 % injection, 10 mL  bupivacaine PF (MARCAINE) 0.25 % injection, 20 mL      Medications  Comment:Ultrasound Interpretation: Using ultrasound guidance, the needle was placed in close proximity to the target nerve and anesthetic was injected in the area of the target nerve and/or bundles, and spread of the anesthetic was seen on ultrasound in close proximity thereto.  There were no abnormalities seen on ultrasound; a digital / physical  image was taken; and the patient tolerated the procedure with no complications.   Block placed for postoperative pain control per surgeon request.    Post Assessment  Injection Assessment: negative aspiration for heme, no paresthesia on injection and incremental injection  Patient Tolerance:comfortable throughout block  Complications:no

## 2023-03-02 DIAGNOSIS — S42.211A CLOSED DISPLACED FRACTURE OF SURGICAL NECK OF RIGHT HUMERUS, UNSPECIFIED FRACTURE MORPHOLOGY, INITIAL ENCOUNTER: Primary | ICD-10-CM

## 2023-03-03 ENCOUNTER — OFFICE VISIT (OUTPATIENT)
Dept: ORTHOPEDICS | Facility: CLINIC | Age: 63
End: 2023-03-03
Payer: COMMERCIAL

## 2023-03-03 ENCOUNTER — HOSPITAL ENCOUNTER (OUTPATIENT)
Dept: RADIOLOGY | Facility: HOSPITAL | Age: 63
Discharge: HOME | End: 2023-03-03
Attending: ORTHOPAEDIC SURGERY
Payer: COMMERCIAL

## 2023-03-03 DIAGNOSIS — S42.291A CLOSED 3-PART FRACTURE OF PROXIMAL END OF RIGHT HUMERUS, INITIAL ENCOUNTER: Primary | ICD-10-CM

## 2023-03-03 DIAGNOSIS — S42.211A CLOSED DISPLACED FRACTURE OF SURGICAL NECK OF RIGHT HUMERUS, UNSPECIFIED FRACTURE MORPHOLOGY, INITIAL ENCOUNTER: ICD-10-CM

## 2023-03-03 PROCEDURE — 99999 PR PBB SHADOW E&M-EST. PATIENT-LVL III: ICD-10-PCS | Mod: PBBFAC,,, | Performed by: ORTHOPAEDIC SURGERY

## 2023-03-03 PROCEDURE — 99999 PR PBB SHADOW E&M-EST. PATIENT-LVL III: CPT | Mod: PBBFAC,,, | Performed by: ORTHOPAEDIC SURGERY

## 2023-03-03 PROCEDURE — 1159F PR MEDICATION LIST DOCUMENTED IN MEDICAL RECORD: ICD-10-PCS | Mod: CPTII,S$GLB,, | Performed by: ORTHOPAEDIC SURGERY

## 2023-03-03 PROCEDURE — 73060 X-RAY EXAM OF HUMERUS: CPT | Mod: 26,RT,, | Performed by: RADIOLOGY

## 2023-03-03 PROCEDURE — 73060 X-RAY EXAM OF HUMERUS: CPT | Mod: TC,PN,RT

## 2023-03-03 PROCEDURE — 1160F PR REVIEW ALL MEDS BY PRESCRIBER/CLIN PHARMACIST DOCUMENTED: ICD-10-PCS | Mod: CPTII,S$GLB,, | Performed by: ORTHOPAEDIC SURGERY

## 2023-03-03 PROCEDURE — 73060 XR HUMERUS 2 VIEW RIGHT: ICD-10-PCS | Mod: 26,RT,, | Performed by: RADIOLOGY

## 2023-03-03 PROCEDURE — 99213 OFFICE O/P EST LOW 20 MIN: CPT | Mod: 57,S$GLB,, | Performed by: ORTHOPAEDIC SURGERY

## 2023-03-03 PROCEDURE — 23600 CLTX PROX HUMRL FX W/O MNPJ: CPT | Mod: RT,S$GLB,, | Performed by: ORTHOPAEDIC SURGERY

## 2023-03-03 PROCEDURE — 1160F RVW MEDS BY RX/DR IN RCRD: CPT | Mod: CPTII,S$GLB,, | Performed by: ORTHOPAEDIC SURGERY

## 2023-03-03 PROCEDURE — 1159F MED LIST DOCD IN RCRD: CPT | Mod: CPTII,S$GLB,, | Performed by: ORTHOPAEDIC SURGERY

## 2023-03-03 PROCEDURE — 23600 PR CLOSED RX PROX HUMERUS FRACTURE: ICD-10-PCS | Mod: RT,S$GLB,, | Performed by: ORTHOPAEDIC SURGERY

## 2023-03-03 PROCEDURE — 99213 PR OFFICE/OUTPT VISIT, EST, LEVL III, 20-29 MIN: ICD-10-PCS | Mod: 57,S$GLB,, | Performed by: ORTHOPAEDIC SURGERY

## 2023-03-03 NOTE — PROGRESS NOTES
Subjective:      Patient ID: Sigrid Edward is a 62 y.o. female.    Chief Complaint: Pain and Injury of the Right Shoulder    HPI  Patient follow up on her right proximal humerus fracture pain is much improved.  She is  working diligently on gentle range-of-motion exercises particularly of her elbow.  ROS      Objective:    Ortho Exam     Constitutional:   Patient is alert  and oriented in no acute distress  HEENT:  normocephalic atraumatic; PERRL EOMI  Neck:  Supple without adenopathy  Cardiovascular:  Normal rate and rhythm  Pulmonary:  Normal respiratory effort normal chest wall expansion  Abdominal:  Nonprotuberant nondistended  Musculoskeletal:  Still has quite limited active range of motion of her shoulder.  It has improved elbow range of motion  She has a normal distal neurologic and vascular examination.  Neurological:  No focal defect; cranial nerves 2-12 grossly intact  Psychiatric/behavioral:  Mood and behavior normal      My Findings:    Radiographs of the right shoulder in the outpatient setting today show maintained alignment and early healing response  Assessment:       Encounter Diagnosis   Name Primary?    Closed 3-part fracture of proximal end of right humerus, initial encounter Yes         Plan:       I have discussed medical condition and treatment options with her at length.  She may continue to gradually advance her range of motion she declines any therapy at this point feels like she is progressing well.  I will see her back in 4 weeks for range-of-motion check and radiographs of the right shoulder I will see her sooner if any questions or problems.        Past Medical History:   Diagnosis Date    Breast cancer      Past Surgical History:   Procedure Laterality Date    BREAST BIOPSY Right     BREAST LUMPECTOMY      HYSTERECTOMY           Current Outpatient Medications:     HYDROcodone-acetaminophen (NORCO) 5-325 mg per tablet, Take 1 tablet by mouth every 6 (six) hours as needed for Pain.,  Disp: 30 tablet, Rfl: 0    hydrocortisone 2.5 % cream, Apply topically 2 (two) times daily., Disp: 20 g, Rfl: 1    multivitamin (THERAGRAN) per tablet, Take 1 tablet by mouth once daily., Disp: , Rfl:     ondansetron (ZOFRAN) 4 MG tablet, Take 1 tablet (4 mg total) by mouth every 6 (six) hours as needed for Nausea., Disp: 12 tablet, Rfl: 0    venlafaxine (EFFEXOR) 37.5 MG Tab, Take 1 tablet (37.5 mg total) by mouth 2 (two) times daily., Disp: 60 tablet, Rfl: 11    Review of patient's allergies indicates:  No Known Allergies    Family History   Problem Relation Age of Onset    Lymphoma Mother      Social History     Occupational History    Not on file   Tobacco Use    Smoking status: Former     Types: Cigarettes     Quit date: 2016     Years since quittin.6    Smokeless tobacco: Never   Substance and Sexual Activity    Alcohol use: No    Drug use: No    Sexual activity: Not on file

## 2023-03-28 ENCOUNTER — TELEPHONE (OUTPATIENT)
Dept: HEMATOLOGY/ONCOLOGY | Facility: CLINIC | Age: 63
End: 2023-03-28

## 2023-03-30 DIAGNOSIS — S42.291D CLOSED 3-PART FRACTURE OF PROXIMAL END OF RIGHT HUMERUS WITH ROUTINE HEALING, SUBSEQUENT ENCOUNTER: Primary | ICD-10-CM

## 2023-03-31 ENCOUNTER — OFFICE VISIT (OUTPATIENT)
Dept: ORTHOPEDICS | Facility: CLINIC | Age: 63
End: 2023-03-31
Payer: COMMERCIAL

## 2023-03-31 ENCOUNTER — HOSPITAL ENCOUNTER (OUTPATIENT)
Dept: RADIOLOGY | Facility: HOSPITAL | Age: 63
Discharge: HOME | End: 2023-03-31
Attending: ORTHOPAEDIC SURGERY
Payer: COMMERCIAL

## 2023-03-31 DIAGNOSIS — S42.291D CLOSED 3-PART FRACTURE OF PROXIMAL END OF RIGHT HUMERUS WITH ROUTINE HEALING, SUBSEQUENT ENCOUNTER: Primary | ICD-10-CM

## 2023-03-31 DIAGNOSIS — S42.291D CLOSED 3-PART FRACTURE OF PROXIMAL END OF RIGHT HUMERUS WITH ROUTINE HEALING, SUBSEQUENT ENCOUNTER: ICD-10-CM

## 2023-03-31 PROCEDURE — 1159F PR MEDICATION LIST DOCUMENTED IN MEDICAL RECORD: ICD-10-PCS | Mod: CPTII,S$GLB,, | Performed by: ORTHOPAEDIC SURGERY

## 2023-03-31 PROCEDURE — 1160F RVW MEDS BY RX/DR IN RCRD: CPT | Mod: CPTII,S$GLB,, | Performed by: ORTHOPAEDIC SURGERY

## 2023-03-31 PROCEDURE — 99999 PR PBB SHADOW E&M-EST. PATIENT-LVL II: CPT | Mod: PBBFAC,,, | Performed by: ORTHOPAEDIC SURGERY

## 2023-03-31 PROCEDURE — 73030 XR SHOULDER TRAUMA 3 VIEW RIGHT: ICD-10-PCS | Mod: 26,RT,, | Performed by: RADIOLOGY

## 2023-03-31 PROCEDURE — 73030 X-RAY EXAM OF SHOULDER: CPT | Mod: TC,PN,RT

## 2023-03-31 PROCEDURE — 1159F MED LIST DOCD IN RCRD: CPT | Mod: CPTII,S$GLB,, | Performed by: ORTHOPAEDIC SURGERY

## 2023-03-31 PROCEDURE — 99999 PR PBB SHADOW E&M-EST. PATIENT-LVL II: ICD-10-PCS | Mod: PBBFAC,,, | Performed by: ORTHOPAEDIC SURGERY

## 2023-03-31 PROCEDURE — 1160F PR REVIEW ALL MEDS BY PRESCRIBER/CLIN PHARMACIST DOCUMENTED: ICD-10-PCS | Mod: CPTII,S$GLB,, | Performed by: ORTHOPAEDIC SURGERY

## 2023-03-31 PROCEDURE — 99024 PR POST-OP FOLLOW-UP VISIT: ICD-10-PCS | Mod: S$GLB,,, | Performed by: ORTHOPAEDIC SURGERY

## 2023-03-31 PROCEDURE — 73030 X-RAY EXAM OF SHOULDER: CPT | Mod: 26,RT,, | Performed by: RADIOLOGY

## 2023-03-31 PROCEDURE — 99024 POSTOP FOLLOW-UP VISIT: CPT | Mod: S$GLB,,, | Performed by: ORTHOPAEDIC SURGERY

## 2023-03-31 NOTE — PROGRESS NOTES
Subjective:      Patient ID: Sigrid Edward is a 62 y.o. female.    Chief Complaint: Injury and Pain of the Right Shoulder    HPI  Patient follow up on her right proximal humerus fracture.  Since her last visit she is had some skin cancers taken off her back and face she is been working on her own range of motion feels like she is progressing well.  ROS      Objective:    Ortho Exam     She has much improved range of motion of her shoulder actively has a little difficulty with abduction  She is very comfortable 90° of forward flexion and beyond.I have personally reviewed radiographs and concur with above findings      X-Ray Humerus 2 View Right  EXAMINATION:  XR HUMERUS 2 VIEW RIGHT    CLINICAL HISTORY:  Unspecified displaced fracture of surgical neck of right humerus, initial encounter for closed fracture    TECHNIQUE:  Frontal and lateral images right humerus    COMPARISON:  02/05/2023    FINDINGS:  Subacute fracture centered along the right humeral neck with greater tuberosity involvement.  No detrimental change in alignment of fragments with some callus formation.  Incomplete bony bridging.  Mild degenerative change AC joint.    Electronically signed by: Melvin Nixon  Date:    03/03/2023  Time:    11:32       My Findings:    I have personally reviewed radiographs and concur with above findings radiographs today of the right shoulder in the outpatient setting show no change in fracture alignment additional healing response    Assessment:       Encounter Diagnosis   Name Primary?    Closed 3-part fracture of proximal end of right humerus with routine healing, subsequent encounter Yes         Plan:       I have encouraged him formal physical therapy but she is actually doing quite well with a home rehab program and I think she can persist.  She may gradually advance activities over the next 4-6 weeks follow up with me at that time for radiographs sooner if any questions or problems        Past Medical  History:   Diagnosis Date    Breast cancer      Past Surgical History:   Procedure Laterality Date    BREAST BIOPSY Right     BREAST LUMPECTOMY      HYSTERECTOMY           Current Outpatient Medications:     HYDROcodone-acetaminophen (NORCO) 5-325 mg per tablet, Take 1 tablet by mouth every 6 (six) hours as needed for Pain., Disp: 30 tablet, Rfl: 0    hydrocortisone 2.5 % cream, Apply topically 2 (two) times daily., Disp: 20 g, Rfl: 1    multivitamin (THERAGRAN) per tablet, Take 1 tablet by mouth once daily., Disp: , Rfl:     ondansetron (ZOFRAN) 4 MG tablet, Take 1 tablet (4 mg total) by mouth every 6 (six) hours as needed for Nausea., Disp: 12 tablet, Rfl: 0    venlafaxine (EFFEXOR) 37.5 MG Tab, TAKE 1 TABLET(37.5 MG) BY MOUTH TWICE DAILY, Disp: 60 tablet, Rfl: 11    Review of patient's allergies indicates:  No Known Allergies    Family History   Problem Relation Age of Onset    Lymphoma Mother      Social History     Occupational History    Not on file   Tobacco Use    Smoking status: Former     Types: Cigarettes     Quit date: 2016     Years since quittin.7    Smokeless tobacco: Never   Substance and Sexual Activity    Alcohol use: No    Drug use: No    Sexual activity: Not on file

## 2023-04-05 ENCOUNTER — TELEPHONE (OUTPATIENT)
Dept: FAMILY MEDICINE | Facility: CLINIC | Age: 63
End: 2023-04-05

## 2023-04-05 NOTE — TELEPHONE ENCOUNTER
----- Message from Karin Garza sent at 4/5/2023 12:02 PM CDT -----  Pt  called about a bill pt is receiving he states he has contacted billing and they instructed him to call the office about the bill. To. [phone]

## 2023-04-05 NOTE — TELEPHONE ENCOUNTER
----- Message from Jennifer Salamanca sent at 4/5/2023  2:43 PM CDT -----  Contact: To  Pt's  To was irate about her 2/7/23 visit. To states that the 2/7/23 visit has to be a physical. Informed To that going forward the pt is medicare and can only have an  AWV in the future. Please advise. To @[phone]

## 2023-04-06 ENCOUNTER — HOSPITAL ENCOUNTER (OUTPATIENT)
Dept: RADIOLOGY | Facility: HOSPITAL | Age: 63
Discharge: HOME | End: 2023-04-06
Attending: INTERNAL MEDICINE
Payer: MEDICARE

## 2023-04-06 VITALS — WEIGHT: 170 LBS | HEIGHT: 60 IN | BODY MASS INDEX: 33.38 KG/M2

## 2023-04-06 DIAGNOSIS — Z12.31 SCREENING MAMMOGRAM FOR HIGH-RISK PATIENT: ICD-10-CM

## 2023-04-06 PROCEDURE — 77067 SCR MAMMO BI INCL CAD: CPT | Mod: TC,PO

## 2023-05-04 DIAGNOSIS — S42.291D CLOSED 3-PART FRACTURE OF PROXIMAL END OF RIGHT HUMERUS WITH ROUTINE HEALING, SUBSEQUENT ENCOUNTER: Primary | ICD-10-CM

## 2023-05-05 ENCOUNTER — HOSPITAL ENCOUNTER (OUTPATIENT)
Dept: RADIOLOGY | Facility: HOSPITAL | Age: 63
Discharge: HOME | End: 2023-05-05
Attending: ORTHOPAEDIC SURGERY
Payer: MEDICARE

## 2023-05-05 ENCOUNTER — OFFICE VISIT (OUTPATIENT)
Dept: ORTHOPEDICS | Facility: CLINIC | Age: 63
End: 2023-05-05
Payer: MEDICARE

## 2023-05-05 VITALS — HEIGHT: 60 IN | BODY MASS INDEX: 33.38 KG/M2 | WEIGHT: 170 LBS

## 2023-05-05 DIAGNOSIS — S42.291D CLOSED 3-PART FRACTURE OF PROXIMAL END OF RIGHT HUMERUS WITH ROUTINE HEALING, SUBSEQUENT ENCOUNTER: Primary | ICD-10-CM

## 2023-05-05 DIAGNOSIS — S42.291D CLOSED 3-PART FRACTURE OF PROXIMAL END OF RIGHT HUMERUS WITH ROUTINE HEALING, SUBSEQUENT ENCOUNTER: ICD-10-CM

## 2023-05-05 PROCEDURE — 3288F PR FALLS RISK ASSESSMENT DOCUMENTED: ICD-10-PCS | Mod: CPTII,S$GLB,, | Performed by: ORTHOPAEDIC SURGERY

## 2023-05-05 PROCEDURE — 1160F RVW MEDS BY RX/DR IN RCRD: CPT | Mod: CPTII,S$GLB,, | Performed by: ORTHOPAEDIC SURGERY

## 2023-05-05 PROCEDURE — 3008F PR BODY MASS INDEX (BMI) DOCUMENTED: ICD-10-PCS | Mod: CPTII,S$GLB,, | Performed by: ORTHOPAEDIC SURGERY

## 2023-05-05 PROCEDURE — 73060 X-RAY EXAM OF HUMERUS: CPT | Mod: TC,PN,RT

## 2023-05-05 PROCEDURE — 1101F PR PT FALLS ASSESS DOC 0-1 FALLS W/OUT INJ PAST YR: ICD-10-PCS | Mod: CPTII,S$GLB,, | Performed by: ORTHOPAEDIC SURGERY

## 2023-05-05 PROCEDURE — 1159F MED LIST DOCD IN RCRD: CPT | Mod: CPTII,S$GLB,, | Performed by: ORTHOPAEDIC SURGERY

## 2023-05-05 PROCEDURE — 99999 PR PBB SHADOW E&M-EST. PATIENT-LVL II: CPT | Mod: PBBFAC,,, | Performed by: ORTHOPAEDIC SURGERY

## 2023-05-05 PROCEDURE — 73060 X-RAY EXAM OF HUMERUS: CPT | Mod: 26,RT,, | Performed by: RADIOLOGY

## 2023-05-05 PROCEDURE — 73060 XR HUMERUS 2 VIEW RIGHT: ICD-10-PCS | Mod: 26,RT,, | Performed by: RADIOLOGY

## 2023-05-05 PROCEDURE — 1160F PR REVIEW ALL MEDS BY PRESCRIBER/CLIN PHARMACIST DOCUMENTED: ICD-10-PCS | Mod: CPTII,S$GLB,, | Performed by: ORTHOPAEDIC SURGERY

## 2023-05-05 PROCEDURE — 3288F FALL RISK ASSESSMENT DOCD: CPT | Mod: CPTII,S$GLB,, | Performed by: ORTHOPAEDIC SURGERY

## 2023-05-05 PROCEDURE — 3008F BODY MASS INDEX DOCD: CPT | Mod: CPTII,S$GLB,, | Performed by: ORTHOPAEDIC SURGERY

## 2023-05-05 PROCEDURE — 1159F PR MEDICATION LIST DOCUMENTED IN MEDICAL RECORD: ICD-10-PCS | Mod: CPTII,S$GLB,, | Performed by: ORTHOPAEDIC SURGERY

## 2023-05-05 PROCEDURE — 99024 PR POST-OP FOLLOW-UP VISIT: ICD-10-PCS | Mod: S$GLB,,, | Performed by: ORTHOPAEDIC SURGERY

## 2023-05-05 PROCEDURE — 99024 POSTOP FOLLOW-UP VISIT: CPT | Mod: S$GLB,,, | Performed by: ORTHOPAEDIC SURGERY

## 2023-05-05 PROCEDURE — 1101F PT FALLS ASSESS-DOCD LE1/YR: CPT | Mod: CPTII,S$GLB,, | Performed by: ORTHOPAEDIC SURGERY

## 2023-05-05 PROCEDURE — 99999 PR PBB SHADOW E&M-EST. PATIENT-LVL II: ICD-10-PCS | Mod: PBBFAC,,, | Performed by: ORTHOPAEDIC SURGERY

## 2023-05-05 NOTE — PROGRESS NOTES
Subjective:      Patient ID: Sigrid Edward is a 63 y.o. female.    Chief Complaint: Injury and Follow-up of the Right Shoulder    HPI  Patient is in for follow up on her right proximal humerus fracture.  Having very little pain at this point she is pleased with the outcome  ROS      Objective:    Ortho Exam     Constitutional:   Patient is alert  and oriented in no acute distress  HEENT:  normocephalic atraumatic; PERRL EOMI  Neck:  Supple without adenopathy  Cardiovascular:  Normal rate and rhythm  Pulmonary:  Normal respiratory effort normal chest wall expansion  Abdominal:  Nonprotuberant nondistended  Musculoskeletal:  Patient has excellent range of motion lacking only a few degrees of forward flexion internal rotation  Neurological:  No focal defect; cranial nerves 2-12 grossly intact  Psychiatric/behavioral:  Mood and behavior normal      X-Ray Humerus 2 View Right  EXAMINATION:  XR HUMERUS 2 VIEW RIGHT    CLINICAL HISTORY:  Other displaced fracture of upper end of right humerus, subsequent encounter for fracture with routine healing    TECHNIQUE:  Frontal and lateral images right humerus    COMPARISON:  03/03/2023    FINDINGS:  Subacute fracture of the right proximal humerus with some interval callus formation and incomplete bony bridging.  No change in alignment.  Minimal elbow joint degenerative changes.    Electronically signed by: Melvin Nixon  Date:    05/05/2023  Time:    09:42       My Findings:    I have personally reviewed radiographs and concur with above findings    Assessment:       Encounter Diagnosis   Name Primary?    Closed 3-part fracture of proximal end of right humerus with routine healing, subsequent encounter Yes         Plan:       Patient is doing quite well she may continue to advance activities slowly as tolerated over the next 6 weeks.  Follow up me at that time for repeat radiographs sooner if any questions or problems.        Past Medical History:   Diagnosis Date    Breast  cancer      Past Surgical History:   Procedure Laterality Date    BREAST BIOPSY Right     BREAST LUMPECTOMY      HYSTERECTOMY           Current Outpatient Medications:     HYDROcodone-acetaminophen (NORCO) 5-325 mg per tablet, Take 1 tablet by mouth every 6 (six) hours as needed for Pain., Disp: 30 tablet, Rfl: 0    hydrocortisone 2.5 % cream, Apply topically 2 (two) times daily., Disp: 20 g, Rfl: 1    multivitamin (THERAGRAN) per tablet, Take 1 tablet by mouth once daily., Disp: , Rfl:     ondansetron (ZOFRAN) 4 MG tablet, Take 1 tablet (4 mg total) by mouth every 6 (six) hours as needed for Nausea., Disp: 12 tablet, Rfl: 0    venlafaxine (EFFEXOR) 37.5 MG Tab, TAKE 1 TABLET(37.5 MG) BY MOUTH TWICE DAILY, Disp: 60 tablet, Rfl: 11    Review of patient's allergies indicates:  No Known Allergies    Family History   Problem Relation Age of Onset    Lymphoma Mother     Breast cancer Maternal Aunt      Social History     Occupational History    Not on file   Tobacco Use    Smoking status: Former     Types: Cigarettes     Quit date: 2016     Years since quittin.8    Smokeless tobacco: Never   Substance and Sexual Activity    Alcohol use: No    Drug use: No    Sexual activity: Not on file

## 2023-05-09 ENCOUNTER — OFFICE VISIT (OUTPATIENT)
Dept: HEMATOLOGY/ONCOLOGY | Facility: CLINIC | Age: 63
End: 2023-05-09
Payer: MEDICARE

## 2023-05-09 VITALS — BODY MASS INDEX: 33.2 KG/M2 | HEIGHT: 60 IN

## 2023-05-09 DIAGNOSIS — Z12.31 BREAST CANCER SCREENING BY MAMMOGRAM: ICD-10-CM

## 2023-05-09 DIAGNOSIS — Z17.0 MALIGNANT NEOPLASM OF OVERLAPPING SITES OF RIGHT BREAST IN FEMALE, ESTROGEN RECEPTOR POSITIVE: Primary | ICD-10-CM

## 2023-05-09 DIAGNOSIS — C50.811 MALIGNANT NEOPLASM OF OVERLAPPING SITES OF RIGHT BREAST IN FEMALE, ESTROGEN RECEPTOR POSITIVE: Primary | ICD-10-CM

## 2023-05-09 PROCEDURE — 3008F PR BODY MASS INDEX (BMI) DOCUMENTED: ICD-10-PCS | Mod: CPTII,S$GLB,, | Performed by: INTERNAL MEDICINE

## 2023-05-09 PROCEDURE — 99214 PR OFFICE/OUTPT VISIT, EST, LEVL IV, 30-39 MIN: ICD-10-PCS | Mod: S$GLB,,, | Performed by: INTERNAL MEDICINE

## 2023-05-09 PROCEDURE — 99214 OFFICE O/P EST MOD 30 MIN: CPT | Mod: S$GLB,,, | Performed by: INTERNAL MEDICINE

## 2023-05-09 PROCEDURE — 3008F BODY MASS INDEX DOCD: CPT | Mod: CPTII,S$GLB,, | Performed by: INTERNAL MEDICINE

## 2023-05-09 NOTE — PROGRESS NOTES
[facility] Hematology Oncology In Office Follow Up Encounter Progress Note    May 9, 2023    Subjective:      Patient ID:   Sigrid Edward  63 y.o. female  1960  [place]        Chief Complaint:   Breast cancer follow up    HPI:  63 y.o. female with diagnosis of R breast cancer, Stage 1 dx. 1/11/15. Treated with R partial mastectomy and Rad Rx.  Presently off Arimidex adj. Rx.  HF sx better on Effexor BID.  She fell and fx R humerus.     Referred to Dr. Kline for colonoscopy.Negative results per pt.    Skin cancers removed from face, Dr. Booker.    Oncotype dx 7% recur with adjuvant T or A.  ERP +, PRP +. Her 2 pedro  Neg.    Breast Cancer Index 2% risk of recurrence at years 5-10, after 5 years of adjuvant Rx.  Extended adjuvant endocrine Rx not recommended.  Off  Arimidex now.  Observe for now.  Continue Effexor BID for HF sx.    Mamm 22  Negative [facility].    She had covid, undecided on vaccine.    Smoke 1/2 to 1 ppd.  ETOH no.  Allergy no.  Job special .    R partial mastectomy, partial hysterectomy.  M0  Menses onset at 11.  1st child at 21.  No increase joint sx.    Dad COPD. Mom NHL, DM. Recent shingles.    ROS:   GEN: normal without any fever, night sweats or weight loss  HEENT: normal with no HA's, sore throat, stiff neck, changes in vision  CV: normal with no CP, SOB, PND, MEJIA or orthopnea  PULM: normal with no SOB, cough, hemoptysis, sputum or pleuritic pain  GI: normal with no abdominal pain, nausea, vomiting, constipation, diarrhea, melanotic stools, BRBPR, or hematemesis  : normal with no hematuria, dysuria  BREAST: See HPI  SKIN: normal with no rash, erythema, bruising, or swelling     Past Medical History:   Diagnosis Date    Breast cancer      Past Surgical History:   Procedure Laterality Date    BREAST BIOPSY Right     BREAST LUMPECTOMY      HYSTERECTOMY         Review of patient's allergies indicates:  No Known Allergies        Current  Outpatient Medications:     HYDROcodone-acetaminophen (NORCO) 5-325 mg per tablet, Take 1 tablet by mouth every 6 (six) hours as needed for Pain., Disp: 30 tablet, Rfl: 0    hydrocortisone 2.5 % cream, Apply topically 2 (two) times daily., Disp: 20 g, Rfl: 1    multivitamin (THERAGRAN) per tablet, Take 1 tablet by mouth once daily., Disp: , Rfl:     ondansetron (ZOFRAN) 4 MG tablet, Take 1 tablet (4 mg total) by mouth every 6 (six) hours as needed for Nausea., Disp: 12 tablet, Rfl: 0    venlafaxine (EFFEXOR) 37.5 MG Tab, TAKE 1 TABLET(37.5 MG) BY MOUTH TWICE DAILY, Disp: 60 tablet, Rfl: 11          Objective:   Vitals:  Height 5' (1.524 m).    Physical Examination:   GEN: no apparent distress, comfortable  HEAD: atraumatic and normocephalic  EYES: no pallor, no icterus  ENT:  no pharyngeal erythema  NECK: no masses, thyroid normal, no LAD/LN's, supple  CV: RRR with no murmur; normal pulse  CHEST: Normal respiratory effort; CTAB; normal breath sounds; no wheeze or crackles  ABDOM: nontender and nondistended; soft;  no rebound/guarding  MUSC/Skeletal: ROM normal; no crepitus; joints normal  EXTREM: no clubbing, cyanosis, inflammation or swelling  SKIN: varicose veins noted at legs  : no escobar  NEURO: grossly intact; motor/sensory WNL;  no tremors  PSYCH: normal mood, affect and behavior  LYMPH: normal cervical, supraclavicular, axillary and groin LN's  BREASTS:L & R breast NT without palpable mass    Assessment:   (1) 63 y.o. female with diagnosis of Stage 1 Right breast cancer, off adjuvant arimidex daily.   Mammogram. 4/6/23 negative.    (2)on effexor for HF sx.      (3) Checked  Breast Cancer Index regards 5 vs 8 years of adjuvant Rx.   Results as per HPI.  Off Arimidex now.  Observe for now.  RTC 9 months.

## 2023-05-25 ENCOUNTER — TELEPHONE (OUTPATIENT)
Dept: FAMILY MEDICINE | Facility: CLINIC | Age: 63
End: 2023-05-25

## 2023-07-26 ENCOUNTER — TELEPHONE (OUTPATIENT)
Dept: FAMILY MEDICINE | Facility: CLINIC | Age: 63
End: 2023-07-26

## 2023-07-26 NOTE — TELEPHONE ENCOUNTER
Spoke to patient that fasting lab is due a week prior to visit to check cholesterol, order at [facility].

## 2023-07-29 LAB
CHOLEST SERPL-MCNC: 197 MG/DL
CHOLEST/HDLC SERPL: 3.3 (CALC)
HDLC SERPL-MCNC: 59 MG/DL
LDLC SERPL CALC-MCNC: 119 MG/DL (CALC)
NONHDLC SERPL-MCNC: 138 MG/DL (CALC)
TRIGL SERPL-MCNC: 93 MG/DL

## 2023-08-08 ENCOUNTER — OFFICE VISIT (OUTPATIENT)
Dept: FAMILY MEDICINE | Facility: CLINIC | Age: 63
End: 2023-08-08
Payer: MEDICARE

## 2023-08-08 VITALS
HEIGHT: 60 IN | HEART RATE: 74 BPM | SYSTOLIC BLOOD PRESSURE: 110 MMHG | BODY MASS INDEX: 33.18 KG/M2 | DIASTOLIC BLOOD PRESSURE: 74 MMHG | WEIGHT: 169 LBS

## 2023-08-08 DIAGNOSIS — D69.2 SENILE PURPURA: ICD-10-CM

## 2023-08-08 DIAGNOSIS — D04.39 SQUAMOUS CELL CARCINOMA IN SITU (SCCIS) OF SKIN OF CHEEK: ICD-10-CM

## 2023-08-08 DIAGNOSIS — C50.811 MALIGNANT NEOPLASM OF OVERLAPPING SITES OF RIGHT BREAST IN FEMALE, ESTROGEN RECEPTOR POSITIVE: ICD-10-CM

## 2023-08-08 DIAGNOSIS — Z17.0 MALIGNANT NEOPLASM OF OVERLAPPING SITES OF RIGHT BREAST IN FEMALE, ESTROGEN RECEPTOR POSITIVE: ICD-10-CM

## 2023-08-08 DIAGNOSIS — E78.2 MIXED HYPERLIPIDEMIA: Primary | ICD-10-CM

## 2023-08-08 DIAGNOSIS — S42.411D CLOSED SUPRACONDYLAR FRACTURE OF RIGHT HUMERUS WITH ROUTINE HEALING: ICD-10-CM

## 2023-08-08 DIAGNOSIS — L57.0 ACTINIC KERATOSES: ICD-10-CM

## 2023-08-08 DIAGNOSIS — K59.01 SLOW TRANSIT CONSTIPATION: ICD-10-CM

## 2023-08-08 DIAGNOSIS — Z78.0 MENOPAUSE: ICD-10-CM

## 2023-08-08 PROCEDURE — 3008F BODY MASS INDEX DOCD: CPT | Mod: CPTII,S$GLB,, | Performed by: FAMILY MEDICINE

## 2023-08-08 PROCEDURE — 99214 PR OFFICE/OUTPT VISIT, EST, LEVL IV, 30-39 MIN: ICD-10-PCS | Mod: S$GLB,,, | Performed by: FAMILY MEDICINE

## 2023-08-08 PROCEDURE — 99214 OFFICE O/P EST MOD 30 MIN: CPT | Mod: S$GLB,,, | Performed by: FAMILY MEDICINE

## 2023-08-08 PROCEDURE — 3288F FALL RISK ASSESSMENT DOCD: CPT | Mod: CPTII,S$GLB,, | Performed by: FAMILY MEDICINE

## 2023-08-08 PROCEDURE — 1101F PT FALLS ASSESS-DOCD LE1/YR: CPT | Mod: CPTII,S$GLB,, | Performed by: FAMILY MEDICINE

## 2023-08-08 PROCEDURE — 3074F PR MOST RECENT SYSTOLIC BLOOD PRESSURE < 130 MM HG: ICD-10-PCS | Mod: CPTII,S$GLB,, | Performed by: FAMILY MEDICINE

## 2023-08-08 PROCEDURE — 3078F DIAST BP <80 MM HG: CPT | Mod: CPTII,S$GLB,, | Performed by: FAMILY MEDICINE

## 2023-08-08 PROCEDURE — 1159F MED LIST DOCD IN RCRD: CPT | Mod: CPTII,S$GLB,, | Performed by: FAMILY MEDICINE

## 2023-08-08 PROCEDURE — 3008F PR BODY MASS INDEX (BMI) DOCUMENTED: ICD-10-PCS | Mod: CPTII,S$GLB,, | Performed by: FAMILY MEDICINE

## 2023-08-08 PROCEDURE — 3078F PR MOST RECENT DIASTOLIC BLOOD PRESSURE < 80 MM HG: ICD-10-PCS | Mod: CPTII,S$GLB,, | Performed by: FAMILY MEDICINE

## 2023-08-08 PROCEDURE — 3074F SYST BP LT 130 MM HG: CPT | Mod: CPTII,S$GLB,, | Performed by: FAMILY MEDICINE

## 2023-08-08 PROCEDURE — 1159F PR MEDICATION LIST DOCUMENTED IN MEDICAL RECORD: ICD-10-PCS | Mod: CPTII,S$GLB,, | Performed by: FAMILY MEDICINE

## 2023-08-08 PROCEDURE — 3288F PR FALLS RISK ASSESSMENT DOCUMENTED: ICD-10-PCS | Mod: CPTII,S$GLB,, | Performed by: FAMILY MEDICINE

## 2023-08-08 PROCEDURE — 1101F PR PT FALLS ASSESS DOC 0-1 FALLS W/OUT INJ PAST YR: ICD-10-PCS | Mod: CPTII,S$GLB,, | Performed by: FAMILY MEDICINE

## 2023-08-08 NOTE — PROGRESS NOTES
SUBJECTIVE:    Patient ID: Sigrid Edward is a 63 y.o. female.    Chief Complaint: Follow-up (Brought bottle, dexa ordered, declined Pna vaccine, review Lab-results, abc )    63-year-old female here for six-month checkup.  She had a right humerus fracture in February of 2023 it is now healed and she has had recovery of her range of motion even without physical therapy.  No longer takes pain medication for this.  She does take Aleve for osteoarthritis of the fingers and knees.    He is a nonsmoker nondrinker, has been retired for over 1 year now.    Constipation-taking over-the-counter laxatives    2019-colonoscopy with Dr. Kline-RTC 5 years    Urinary stress incontinence worse with coughing and laughing.  Uses a pad for protection occasionally    Gyn-Dr. Shen unfortunately passed away, she needs a new gyn    Dermatology-Dr. Carreon-has removed skin cancers from her right cheek and right scapular area.  She currently has actinic keratoses    2015 breast carcinoma, in remission currently follows up with Dr. Lul Berg, he  has her on yearly mammograms, off tamoxifen/ takes venlafaxine 37.5 mg b.i.d. for hot flashes    Has had herpes zoster x2, mild cases, refuses any vaccines including pneumonia vaccines shingles vaccines COVID vaccine        No visits with results within 6 Month(s) from this visit.   Latest known visit with results is:   Office Visit on 02/07/2023   Component Date Value Ref Range Status    Cholesterol 07/28/2023 197  <200 mg/dL Final    HDL 07/28/2023 59  > OR = 50 mg/dL Final    Triglycerides 07/28/2023 93  <150 mg/dL Final    LDL Cholesterol 07/28/2023 119 (H)  mg/dL (calc) Final    HDL/Cholesterol Ratio 07/28/2023 3.3  <5.0 (calc) Final    Non HDL Chol. (LDL+VLDL) 07/28/2023 138 (H)  <130 mg/dL (calc) Final       Past Medical History:   Diagnosis Date    Breast cancer      Social History     Socioeconomic History    Marital status:    Tobacco Use    Smoking status: Former      Current packs/day: 0.00     Types: Cigarettes     Quit date: 2016     Years since quittin.0    Smokeless tobacco: Never   Substance and Sexual Activity    Alcohol use: No    Drug use: No     Past Surgical History:   Procedure Laterality Date    BREAST BIOPSY Right     BREAST LUMPECTOMY      HYSTERECTOMY       Family History   Problem Relation Age of Onset    Lymphoma Mother     Breast cancer Maternal Aunt        The CVD Risk score (OLGAAgostino, et al., 2008) failed to calculate for the following reasons:    The patient has a prior MI, stroke, CHF, or peripheral vascular disease diagnosis    All of your core healthy metrics are met.      Review of patient's allergies indicates:  No Known Allergies    Current Outpatient Medications:     multivitamin (THERAGRAN) per tablet, Take 1 tablet by mouth once daily., Disp: , Rfl:     venlafaxine (EFFEXOR) 37.5 MG Tab, TAKE 1 TABLET(37.5 MG) BY MOUTH TWICE DAILY, Disp: 60 tablet, Rfl: 11    HYDROcodone-acetaminophen (NORCO) 5-325 mg per tablet, Take 1 tablet by mouth every 6 (six) hours as needed for Pain. (Patient not taking: Reported on 2023), Disp: 30 tablet, Rfl: 0    hydrocortisone 2.5 % cream, Apply topically 2 (two) times daily. (Patient not taking: Reported on 2023), Disp: 20 g, Rfl: 1    ondansetron (ZOFRAN) 4 MG tablet, Take 1 tablet (4 mg total) by mouth every 6 (six) hours as needed for Nausea. (Patient not taking: Reported on 2023), Disp: 12 tablet, Rfl: 0    Review of Systems   Constitutional:  Negative for appetite change, chills, fatigue, fever and unexpected weight change.   HENT:  Negative for ear discharge and ear pain.    Eyes:  Negative for pain, discharge and visual disturbance.   Respiratory:  Negative for apnea, cough, shortness of breath and wheezing.    Cardiovascular:  Negative for chest pain, palpitations and leg swelling.   Gastrointestinal:  Negative for abdominal pain, blood in stool, constipation, diarrhea, nausea, vomiting  and reflux.   Endocrine: Negative for cold intolerance, heat intolerance and polydipsia.   Genitourinary:  Negative for bladder incontinence, dysuria, hematuria, nocturia and urgency.   Musculoskeletal:  Positive for arthralgias (Mild arthritis of the hands and fingers). Negative for gait problem, joint swelling and myalgias.   Integumentary:  Positive for mole/lesion (actinic keratoses to the right temple).   Neurological:  Negative for dizziness, seizures and numbness.   Hematological:  Bruises/bleeds easily.   Psychiatric/Behavioral:  Negative for behavioral problems and hallucinations. The patient is not nervous/anxious.            Objective:      Vitals:    08/08/23 0916   BP: 110/74   Pulse: 74   Weight: 76.7 kg (169 lb)   Height: 5' (1.524 m)     Physical Exam  Vitals and nursing note reviewed.   Constitutional:       General: She is not in acute distress.     Appearance: She is well-developed. She is obese. She is not toxic-appearing.   HENT:      Head: Normocephalic and atraumatic.      Right Ear: Tympanic membrane and external ear normal.      Left Ear: Tympanic membrane and external ear normal.      Nose: Nose normal.      Mouth/Throat:      Pharynx: Oropharynx is clear.   Eyes:      Pupils: Pupils are equal, round, and reactive to light.   Neck:      Thyroid: No thyromegaly.      Vascular: No carotid bruit.   Cardiovascular:      Rate and Rhythm: Normal rate and regular rhythm.      Heart sounds: Normal heart sounds. No murmur heard.  Pulmonary:      Effort: Pulmonary effort is normal.      Breath sounds: Normal breath sounds. No wheezing or rales.   Abdominal:      General: Bowel sounds are normal. There is no distension.      Palpations: Abdomen is soft.      Tenderness: There is no abdominal tenderness.   Musculoskeletal:         General: No tenderness or deformity. Normal range of motion.      Cervical back: Normal range of motion and neck supple.      Lumbar back: Normal. No spasms.      Comments:  Bends 90 degrees at  waist, shoulders and knees have good range of motion without crepitance.  No pitting edema to lower extremities.   Lymphadenopathy:      Cervical: No cervical adenopathy.   Skin:     General: Skin is warm and dry.      Findings: Bruising (minor bruising to both forearms) present. No rash.      Comments: Actinic keratosis noted to the right forehead/temple   Neurological:      Mental Status: She is alert and oriented to person, place, and time. Mental status is at baseline.      Cranial Nerves: No cranial nerve deficit.      Coordination: Coordination normal.      Gait: Gait normal.   Psychiatric:         Mood and Affect: Mood normal.         Behavior: Behavior normal.         Thought Content: Thought content normal.         Judgment: Judgment normal.           Assessment:       1. Mixed hyperlipidemia    2. Menopause    3. Malignant neoplasm of overlapping sites of right breast in female, estrogen receptor positive    4. 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 Actinic keratoses    6. Closed supracondylar fracture of right humerus with routine healing    7. Slow transit constipation    8. Senile purpura         Plan:       Mixed hyperlipidemia  Cholesterol has improved to 197 HDL 59 TG 93 , continue current diet and increased exercise.  Menopause  -     DXA Bone Density Axial Skeleton 1 or more sites; Future; Expected date: 08/08/2023  DEXA scan ordered  Malignant neoplasm of overlapping sites of right breast in female, estrogen receptor positive  Breast cancer in remission, follows with Dr. 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 Carreon  Closed supracondylar fracture of right humerus with routine healing  Healing nicely and has full range of motion.  Constipation-trial of MiraLax or Senokot.  Follow up in about 6 months (around 2/8/2024), or br  ca.        8/8/2023 Jeremias Coates

## 2023-08-14 ENCOUNTER — HOSPITAL ENCOUNTER (OUTPATIENT)
Dept: RADIOLOGY | Facility: HOSPITAL | Age: 63
Discharge: HOME | End: 2023-08-14
Attending: FAMILY MEDICINE
Payer: MEDICARE

## 2023-08-14 DIAGNOSIS — Z78.0 MENOPAUSE: ICD-10-CM

## 2023-08-14 PROCEDURE — 77080 DXA BONE DENSITY AXIAL: CPT | Mod: TC,PO

## 2023-08-17 ENCOUNTER — TELEPHONE (OUTPATIENT)
Dept: FAMILY MEDICINE | Facility: CLINIC | Age: 63
End: 2023-08-17

## 2023-08-17 NOTE — TELEPHONE ENCOUNTER
Spoke with pt in regards to recent Dexa scan results. Verbalized per Dr. Coates that pt's DEXA scan shows osteopenia of the bones. Treatment includes taking calcium plus D vitamins twice a day. Recheck DEXA scan in 2 years. Pt acknowledged understanding.

## 2023-08-17 NOTE — TELEPHONE ENCOUNTER
----- Message from Silvia Mcginnis sent at 8/17/2023 12:26 PM CDT -----  FYI: patient called and stated she was calling the nurse back about her test results, called the nurse Brianne and she advised to put the call through, no patient call back is needed for this message.

## 2023-08-17 NOTE — TELEPHONE ENCOUNTER
----- Message from Jeremias Coates MD sent at 8/17/2023  8:12 AM CDT -----  Call patient.  DEXA scan shows osteopenia of the bones.  Treatment includes taking calcium plus D vitamins twice a day.  Recheck DEXA scan in 2 years

## 2024-02-05 ENCOUNTER — TELEPHONE (OUTPATIENT)
Dept: FAMILY MEDICINE | Facility: CLINIC | Age: 64
End: 2024-02-05
Payer: MEDICARE

## 2024-02-05 DIAGNOSIS — E78.2 MIXED HYPERLIPIDEMIA: ICD-10-CM

## 2024-02-05 DIAGNOSIS — Z78.0 MENOPAUSE: ICD-10-CM

## 2024-02-05 DIAGNOSIS — Z79.899 ENCOUNTER FOR LONG-TERM (CURRENT) USE OF OTHER MEDICATIONS: Primary | ICD-10-CM

## 2024-02-20 ENCOUNTER — OFFICE VISIT (OUTPATIENT)
Dept: FAMILY MEDICINE | Facility: CLINIC | Age: 64
End: 2024-02-20
Payer: MEDICARE

## 2024-02-20 VITALS
HEIGHT: 59 IN | BODY MASS INDEX: 33.87 KG/M2 | HEART RATE: 69 BPM | OXYGEN SATURATION: 97 % | SYSTOLIC BLOOD PRESSURE: 108 MMHG | WEIGHT: 168 LBS | DIASTOLIC BLOOD PRESSURE: 80 MMHG

## 2024-02-20 DIAGNOSIS — I49.3 PVC (PREMATURE VENTRICULAR CONTRACTION): ICD-10-CM

## 2024-02-20 DIAGNOSIS — E78.2 MIXED HYPERLIPIDEMIA: Primary | ICD-10-CM

## 2024-02-20 DIAGNOSIS — L57.0 ACTINIC KERATOSES: ICD-10-CM

## 2024-02-20 DIAGNOSIS — C50.811 MALIGNANT NEOPLASM OF OVERLAPPING SITES OF RIGHT BREAST IN FEMALE, ESTROGEN RECEPTOR POSITIVE: ICD-10-CM

## 2024-02-20 DIAGNOSIS — D69.2 SENILE PURPURA: ICD-10-CM

## 2024-02-20 DIAGNOSIS — Z17.0 MALIGNANT NEOPLASM OF OVERLAPPING SITES OF RIGHT BREAST IN FEMALE, ESTROGEN RECEPTOR POSITIVE: ICD-10-CM

## 2024-02-20 DIAGNOSIS — F51.01 PRIMARY INSOMNIA: ICD-10-CM

## 2024-02-20 DIAGNOSIS — Z78.0 MENOPAUSE: ICD-10-CM

## 2024-02-20 DIAGNOSIS — K59.01 SLOW TRANSIT CONSTIPATION: ICD-10-CM

## 2024-02-20 DIAGNOSIS — D04.39 SQUAMOUS CELL CARCINOMA IN SITU (SCCIS) OF SKIN OF CHEEK: ICD-10-CM

## 2024-02-20 LAB
EKG 12-LEAD: NORMAL
PR INTERVAL: NORMAL
PRT AXES: NORMAL
QRS DURATION: NORMAL
QT/QTC: NORMAL
VENTRICULAR RATE: NORMAL

## 2024-02-20 PROCEDURE — 3074F SYST BP LT 130 MM HG: CPT | Mod: CPTII,S$GLB,, | Performed by: FAMILY MEDICINE

## 2024-02-20 PROCEDURE — 3008F BODY MASS INDEX DOCD: CPT | Mod: CPTII,S$GLB,, | Performed by: FAMILY MEDICINE

## 2024-02-20 PROCEDURE — 3288F FALL RISK ASSESSMENT DOCD: CPT | Mod: CPTII,S$GLB,, | Performed by: FAMILY MEDICINE

## 2024-02-20 PROCEDURE — 1101F PT FALLS ASSESS-DOCD LE1/YR: CPT | Mod: CPTII,S$GLB,, | Performed by: FAMILY MEDICINE

## 2024-02-20 PROCEDURE — 1159F MED LIST DOCD IN RCRD: CPT | Mod: CPTII,S$GLB,, | Performed by: FAMILY MEDICINE

## 2024-02-20 PROCEDURE — 3079F DIAST BP 80-89 MM HG: CPT | Mod: CPTII,S$GLB,, | Performed by: FAMILY MEDICINE

## 2024-02-20 PROCEDURE — 99214 OFFICE O/P EST MOD 30 MIN: CPT | Mod: 25,S$GLB,, | Performed by: FAMILY MEDICINE

## 2024-02-20 PROCEDURE — 93000 ELECTROCARDIOGRAM COMPLETE: CPT | Mod: S$GLB,,, | Performed by: FAMILY MEDICINE

## 2024-02-20 RX ORDER — ALPRAZOLAM 0.25 MG/1
0.25 TABLET ORAL NIGHTLY PRN
Qty: 30 TABLET | Refills: 1 | Status: SHIPPED | OUTPATIENT
Start: 2024-02-20 | End: 2024-03-21

## 2024-02-25 NOTE — PROGRESS NOTES
"  SUBJECTIVE:    Patient ID: Sigrid Edward is a 63 y.o. female.    Chief Complaint: Follow-up (Bottle brought//Pt here for follow up//declines flu vacc//JL)    63-year-old female who recently lost her mother due to sepsis and bowel perforation.  "I am exhausted trying to clean up her house and take care of her dog".  Still grieving her loss.  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    Drinks 3-4 large mugs of tea daily.    On venlafaxine 37.5 mg daily for hot flashes.  Sleeps well but does not get to bed till 1:00 a.m..    2019-colonoscopy Dr. Kline RTC 10 years, no longer takes MiraLax for constipation.    Breast cancer-followed by Dr. Lul Berg, mammograms done yearly.    Has a right temporal lesion that is somewhat crusty.    Follow-up  Pertinent negatives include no abdominal pain, chest pain, chills, coughing, fatigue, fever, joint swelling, myalgias, nausea, numbness or vomiting.       No visits with results within 6 Month(s) from this visit.   Latest known visit with results is:   Office Visit on 2023   Component Date Value Ref Range Status    Cholesterol 2023 197  <200 mg/dL Final    HDL 2023 59  > OR = 50 mg/dL Final    Triglycerides 2023 93  <150 mg/dL Final    LDL Cholesterol 2023 119 (H)  mg/dL (calc) Final    HDL/Cholesterol Ratio 2023 3.3  <5.0 (calc) Final    Non HDL Chol. (LDL+VLDL) 2023 138 (H)  <130 mg/dL (calc) Final       Past Medical History:   Diagnosis Date    Breast cancer      Social History     Socioeconomic History    Marital status:    Tobacco Use    Smoking status: Former     Current packs/day: 0.00     Types: Cigarettes     Quit date: 2016     Years since quittin.6    Smokeless tobacco: Never   Substance and Sexual Activity    Alcohol use: No    Drug use: No     Past Surgical History:   Procedure Laterality Date    BREAST BIOPSY Right     BREAST LUMPECTOMY      HYSTERECTOMY       Family History   Problem " "Relation Age of Onset    Lymphoma Mother     Breast cancer Maternal Aunt        The CVD Risk score (Bandar, et al., 2008) failed to calculate for the following reasons:    The patient has a prior MI, stroke, CHF, or peripheral vascular disease diagnosis    All of your core healthy metrics are met.      Review of patient's allergies indicates:  No Known Allergies    Current Outpatient Medications:     multivitamin (THERAGRAN) per tablet, Take 1 tablet by mouth once daily., Disp: , Rfl:     venlafaxine (EFFEXOR) 37.5 MG Tab, TAKE 1 TABLET(37.5 MG) BY MOUTH TWICE DAILY, Disp: 60 tablet, Rfl: 11    ALPRAZolam (XANAX) 0.25 MG tablet, Take 1 tablet (0.25 mg total) by mouth nightly as needed for Anxiety or Insomnia., Disp: 30 tablet, Rfl: 1    Review of Systems   Constitutional:  Negative for appetite change, chills, fatigue, fever and unexpected weight change.   HENT:  Negative for ear discharge and ear pain.    Eyes:  Negative for pain, discharge and visual disturbance.   Respiratory:  Negative for apnea, cough, shortness of breath and wheezing.    Cardiovascular:  Negative for chest pain, palpitations and leg swelling.   Gastrointestinal:  Negative for abdominal pain, blood in stool, constipation, diarrhea, nausea, vomiting and reflux.   Endocrine: Negative for cold intolerance, heat intolerance and polydipsia.   Genitourinary:  Positive for hot flashes. Negative for bladder incontinence, dysuria, hematuria, nocturia and urgency.   Musculoskeletal:  Negative for gait problem, joint swelling and myalgias.   Neurological:  Negative for dizziness, seizures and numbness.   Psychiatric/Behavioral:  Positive for dysphoric mood. Negative for behavioral problems and hallucinations. The patient is nervous/anxious.            Objective:      Vitals:    02/20/24 1525   BP: 108/80   Pulse: 69   SpO2: 97%   Weight: 76.2 kg (168 lb)   Height: 4' 11" (1.499 m)     Physical Exam  Vitals and nursing note reviewed.   Constitutional:  "      General: She is not in acute distress.     Appearance: She is well-developed. She is obese. She is not toxic-appearing.   HENT:      Head: Normocephalic and atraumatic.      Right Ear: Tympanic membrane and external ear normal.      Left Ear: Tympanic membrane and external ear normal.      Nose: Nose normal.      Mouth/Throat:      Pharynx: Oropharynx is clear.   Eyes:      Pupils: Pupils are equal, round, and reactive to light.   Neck:      Thyroid: No thyromegaly.      Vascular: No carotid bruit.   Cardiovascular:      Rate and Rhythm: Normal rate. Rhythm irregular.      Heart sounds: Normal heart sounds. No murmur heard.  Pulmonary:      Effort: Pulmonary effort is normal.      Breath sounds: Normal breath sounds. No wheezing or rales.   Abdominal:      General: Bowel sounds are normal. There is no distension.      Palpations: Abdomen is soft.      Tenderness: There is no abdominal tenderness.   Musculoskeletal:         General: No tenderness or deformity. Normal range of motion.      Cervical back: Normal range of motion and neck supple.      Lumbar back: Normal. No spasms.      Comments: Bends 90 degrees at  waist, shoulders and knees good range of motion without crepitance.  No pitting edema to lower extremities   Lymphadenopathy:      Cervical: No cervical adenopathy.   Skin:     General: Skin is warm and dry.      Findings: No rash.      Comments: Right temple has a crusty actinic keratosis   Neurological:      Mental Status: She is alert and oriented to person, place, and time. Mental status is at baseline.      Cranial Nerves: No cranial nerve deficit.      Coordination: Coordination normal.   Psychiatric:         Mood and Affect: Mood is anxious and depressed.         Behavior: Behavior normal.         Thought Content: Thought content normal.         Judgment: Judgment normal.           Assessment:       1. Mixed hyperlipidemia    2. Menopause    3. Senile purpura    4. 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 Malignant neoplasm of overlapping sites of right breast in female, estrogen receptor positive    6. Slow transit constipation    7. PVC (premature ventricular contraction)    8. Primary insomnia    9. Actinic keratoses         Plan:       Mixed hyperlipidemia  Will get lab work done this week to monitor.  Menopause  Continue venlafaxine 37.5 mg  Senile purpura    Squamous cell carcinoma in situ (SCCIS) of skin of cheek    Malignant neoplasm of overlapping sites of right breast in female, estrogen receptor positive  In remission, follows with Dr. Lul Berg  Slow transit constipation  Add Senokot daily  PVC (premature ventricular contraction)  -     POCT EKG 12-LEAD (NOT FOR [facility] USE)  EKG shows sinus rhythm with frequent premature ventricular complexes with ventricular escape complexes.  Primary insomnia  -     ALPRAZolam (XANAX) 0.25 MG tablet; Take 1 tablet (0.25 mg total) by mouth nightly as needed for Anxiety or Insomnia.  Dispense: 30 tablet; Refill: 1  Add alprazolam 0.25 mg HS  Actinic keratoses  Refer to Dr. Carreon [facility] for cryo surgery.    Follow up in about 6 months (around 8/20/2024), or Breast cancer-grief.        2/25/2024 Jeremias Coates

## 2024-03-03 LAB
ALBUMIN SERPL-MCNC: 4.1 G/DL (ref 3.6–5.1)
ALBUMIN/GLOB SERPL: 1.6 (CALC) (ref 1–2.5)
ALP SERPL-CCNC: 94 U/L (ref 37–153)
ALT SERPL-CCNC: 8 U/L (ref 6–29)
APPEARANCE UR: CLEAR
AST SERPL-CCNC: 16 U/L (ref 10–35)
BACTERIA #/AREA URNS HPF: ABNORMAL /HPF
BACTERIA UR CULT: ABNORMAL
BASOPHILS # BLD AUTO: 28 CELLS/UL (ref 0–200)
BASOPHILS NFR BLD AUTO: 0.4 %
BILIRUB SERPL-MCNC: 0.4 MG/DL (ref 0.2–1.2)
BILIRUB UR QL STRIP: NEGATIVE
BUN SERPL-MCNC: 18 MG/DL (ref 7–25)
BUN/CREAT SERPL: 16 (CALC) (ref 6–22)
CALCIUM SERPL-MCNC: 9 MG/DL (ref 8.6–10.4)
CHLORIDE SERPL-SCNC: 106 MMOL/L (ref 98–110)
CHOLEST SERPL-MCNC: 196 MG/DL
CHOLEST/HDLC SERPL: 3.1 (CALC)
CO2 SERPL-SCNC: 28 MMOL/L (ref 20–32)
COLOR UR: YELLOW
CREAT SERPL-MCNC: 1.1 MG/DL (ref 0.5–1.05)
EGFR: 56 ML/MIN/1.73M2
EOSINOPHIL # BLD AUTO: 142 CELLS/UL (ref 15–500)
EOSINOPHIL NFR BLD AUTO: 2 %
ERYTHROCYTE [DISTWIDTH] IN BLOOD BY AUTOMATED COUNT: 12.2 % (ref 11–15)
GLOBULIN SER CALC-MCNC: 2.5 G/DL (CALC) (ref 1.9–3.7)
GLUCOSE SERPL-MCNC: 85 MG/DL (ref 65–99)
GLUCOSE UR QL STRIP: NEGATIVE
HCT VFR BLD AUTO: 42.7 % (ref 35–45)
HDLC SERPL-MCNC: 63 MG/DL
HGB BLD-MCNC: 14 G/DL (ref 11.7–15.5)
HGB UR QL STRIP: NEGATIVE
HYALINE CASTS #/AREA URNS LPF: ABNORMAL /LPF
KETONES UR QL STRIP: NEGATIVE
LDLC SERPL CALC-MCNC: 117 MG/DL (CALC)
LEUKOCYTE ESTERASE UR QL STRIP: NEGATIVE
LYMPHOCYTES # BLD AUTO: 2485 CELLS/UL (ref 850–3900)
LYMPHOCYTES NFR BLD AUTO: 35 %
MCH RBC QN AUTO: 30 PG (ref 27–33)
MCHC RBC AUTO-ENTMCNC: 32.8 G/DL (ref 32–36)
MCV RBC AUTO: 91.6 FL (ref 80–100)
MONOCYTES # BLD AUTO: 504 CELLS/UL (ref 200–950)
MONOCYTES NFR BLD AUTO: 7.1 %
NEUTROPHILS # BLD AUTO: 3941 CELLS/UL (ref 1500–7800)
NEUTROPHILS NFR BLD AUTO: 55.5 %
NITRITE UR QL STRIP: NEGATIVE
NONHDLC SERPL-MCNC: 133 MG/DL (CALC)
PH UR STRIP: 6.5 [PH] (ref 5–8)
PLATELET # BLD AUTO: 257 THOUSAND/UL (ref 140–400)
PMV BLD REES-ECKER: 10.7 FL (ref 7.5–12.5)
POTASSIUM SERPL-SCNC: 4 MMOL/L (ref 3.5–5.3)
PROT SERPL-MCNC: 6.6 G/DL (ref 6.1–8.1)
PROT UR QL STRIP: ABNORMAL
RBC # BLD AUTO: 4.66 MILLION/UL (ref 3.8–5.1)
RBC #/AREA URNS HPF: ABNORMAL /HPF
SERVICE CMNT-IMP: ABNORMAL
SODIUM SERPL-SCNC: 140 MMOL/L (ref 135–146)
SP GR UR STRIP: 1.02 (ref 1–1.03)
SQUAMOUS #/AREA URNS HPF: ABNORMAL /HPF
TRIGL SERPL-MCNC: 70 MG/DL
TSH SERPL-ACNC: 2.74 MIU/L (ref 0.4–4.5)
WBC # BLD AUTO: 7.1 THOUSAND/UL (ref 3.8–10.8)
WBC #/AREA URNS HPF: ABNORMAL /HPF

## 2024-03-04 ENCOUNTER — TELEPHONE (OUTPATIENT)
Dept: FAMILY MEDICINE | Facility: CLINIC | Age: 64
End: 2024-03-04
Payer: MEDICARE

## 2024-03-04 NOTE — TELEPHONE ENCOUNTER
Spoke to patient with results verbatim per Dr Coates. Verbalized understanding on all including to drink more water.

## 2024-03-04 NOTE — TELEPHONE ENCOUNTER
"Per Dr. Coates, "EKG showed PVC's premature ventricular contractions. (No atrial fib). Cut back on caffeine if symptomatic."  Spoke with patient and verbalized understanding of Dr. Coates's message.   "

## 2024-03-04 NOTE — PROGRESS NOTES
Call patient.  Her kidneys look mildly dehydrated.  Liver and sugar looks normal.  Cholesterol excellent at 196.  Urinalysis shows no infection thyroid functions normal, CBC has no anemia.  You need increase your water intake during the day to fight dehydration.

## 2024-03-04 NOTE — TELEPHONE ENCOUNTER
----- Message from Jeremias Coates MD sent at 3/3/2024  7:37 PM CST -----  Call patient.  Her kidneys look mildly dehydrated.  Liver and sugar looks normal.  Cholesterol excellent at 196.  Urinalysis shows no infection thyroid functions normal, CBC has no anemia.  You need increase your water intake during the day to fight dehydration.

## 2024-03-04 NOTE — TELEPHONE ENCOUNTER
----- Message from [facility], RT sent at 3/4/2024  4:03 PM CST -----  Patient said she had EKG at office visit, but didn't get results. Would like these. [phone]

## 2024-04-08 ENCOUNTER — HOSPITAL ENCOUNTER (OUTPATIENT)
Dept: RADIOLOGY | Facility: HOSPITAL | Age: 64
Discharge: HOME | End: 2024-04-08
Attending: INTERNAL MEDICINE
Payer: MEDICARE

## 2024-04-08 VITALS — BODY MASS INDEX: 33.87 KG/M2 | HEIGHT: 59 IN | WEIGHT: 168 LBS

## 2024-04-08 DIAGNOSIS — Z17.0 MALIGNANT NEOPLASM OF OVERLAPPING SITES OF RIGHT BREAST IN FEMALE, ESTROGEN RECEPTOR POSITIVE: ICD-10-CM

## 2024-04-08 DIAGNOSIS — Z12.31 BREAST CANCER SCREENING BY MAMMOGRAM: ICD-10-CM

## 2024-04-08 DIAGNOSIS — C50.811 MALIGNANT NEOPLASM OF OVERLAPPING SITES OF RIGHT BREAST IN FEMALE, ESTROGEN RECEPTOR POSITIVE: ICD-10-CM

## 2024-04-08 PROCEDURE — 77067 SCR MAMMO BI INCL CAD: CPT | Mod: 26,,, | Performed by: RADIOLOGY

## 2024-04-08 PROCEDURE — 77067 SCR MAMMO BI INCL CAD: CPT | Mod: TC,PO

## 2024-04-08 PROCEDURE — 77063 BREAST TOMOSYNTHESIS BI: CPT | Mod: 26,,, | Performed by: RADIOLOGY

## 2024-04-09 DIAGNOSIS — R23.2 HOT FLASHES RELATED TO AROMATASE INHIBITOR THERAPY: ICD-10-CM

## 2024-04-09 DIAGNOSIS — T45.1X5A HOT FLASHES RELATED TO AROMATASE INHIBITOR THERAPY: ICD-10-CM

## 2024-04-09 RX ORDER — VENLAFAXINE 37.5 MG/1
TABLET ORAL
Qty: 60 TABLET | Refills: 11 | Status: SHIPPED | OUTPATIENT
Start: 2024-04-09

## 2024-04-10 ENCOUNTER — TELEPHONE (OUTPATIENT)
Dept: HEMATOLOGY/ONCOLOGY | Facility: CLINIC | Age: 64
End: 2024-04-10

## 2024-04-10 NOTE — TELEPHONE ENCOUNTER
----- Message from Aparna Dow NP sent at 4/10/2024  8:12 AM CDT -----  Clear, repeat in 1 year     ----- Message -----  From: Zara Keating RN  Sent: 4/9/2024   3:58 PM CDT  To: Aparna Dow NP    Effexor already sent -     Can you take a look at the mammogram pretty please?  ----- Message -----  From: Viki Alejo  Sent: 4/9/2024   3:45 PM CDT  To: Morena Mccarty Staff    Pt requesting refill on Effexor 37.5 mg, please send to [facility] in [place]. She would also like a call back with mammo.      [phone]

## 2024-07-31 ENCOUNTER — OFFICE VISIT (OUTPATIENT)
Facility: CLINIC | Age: 64
End: 2024-07-31
Payer: MEDICARE

## 2024-07-31 VITALS
BODY MASS INDEX: 31.74 KG/M2 | HEIGHT: 61 IN | HEART RATE: 67 BPM | DIASTOLIC BLOOD PRESSURE: 87 MMHG | RESPIRATION RATE: 18 BRPM | SYSTOLIC BLOOD PRESSURE: 131 MMHG | TEMPERATURE: 98 F

## 2024-07-31 DIAGNOSIS — Z17.0 MALIGNANT NEOPLASM OF OVERLAPPING SITES OF RIGHT BREAST IN FEMALE, ESTROGEN RECEPTOR POSITIVE: Primary | ICD-10-CM

## 2024-07-31 DIAGNOSIS — C50.811 MALIGNANT NEOPLASM OF OVERLAPPING SITES OF RIGHT BREAST IN FEMALE, ESTROGEN RECEPTOR POSITIVE: Primary | ICD-10-CM

## 2024-07-31 DIAGNOSIS — Z12.31 ENCOUNTER FOR SCREENING MAMMOGRAM FOR BREAST CANCER: ICD-10-CM

## 2024-07-31 PROCEDURE — 99999 PR PBB SHADOW E&M-EST. PATIENT-LVL III: CPT | Mod: PBBFAC,,, | Performed by: INTERNAL MEDICINE

## 2024-07-31 NOTE — PROGRESS NOTES
[facility] [address] Hematology Oncology In Office Follow Up Encounter  Note    24    Subjective:      Patient ID:   Sigrid Edward  64 y.o. female  1960  [place]        Chief Complaint:   Breast cancer follow up    HPI:  64 y.o. female with diagnosis of R breast cancer, Stage 1 dx. 1/11/15. Treated with R partial mastectomy and Rad Rx.  Presently off Arimidex adj. Rx.  HF sx better on Effexor BID.  She fell and fx R humerus.    9 years out, off Arimidex.     Referred to Dr. Kline for colonoscopy.Negative results per pt.    Skin cancers removed from face, Dr. Booker.    Oncotype dx 7% recur with adjuvant T or A.  ERP +, PRP +. Her 2 pedro  Neg.    Breast Cancer Index 2% risk of recurrence at years 5-10, after 5 years of adjuvant Rx.  Extended adjuvant endocrine Rx not recommended.  Off  Arimidex now.  Observe for now.  Continue Effexor BID for HF sx.    Mamm  Negative [facility].  24.    She had covid, undecided on vaccine.    Smoke 1/2 to 1 ppd.  ETOH no.  Allergy no.  Job special .    R partial mastectomy, partial hysterectomy.  M0  Menses onset at 11.  1st child at 21.  No increase joint sx.    Dad COPD. Mom NHL, DM. Recent shingles.    Her mom passed away 2024 of colon perforation, Sepsis.    ROS:   GEN: normal without any fever, night sweats or weight loss  HEENT: normal with no HA's, sore throat, stiff neck, changes in vision  CV: normal with no CP, SOB, PND, MEJIA or orthopnea  PULM: normal with no SOB, cough, hemoptysis, sputum or pleuritic pain  GI: normal with no abdominal pain, nausea, vomiting, constipation, diarrhea, melanotic stools, BRBPR, or hematemesis  : normal with no hematuria, dysuria  BREAST: See HPI  SKIN: normal with no rash, erythema, bruising, or swelling     Past Medical History:   Diagnosis Date    Breast cancer      Past Surgical History:   Procedure Laterality Date    BREAST BIOPSY Right     BREAST LUMPECTOMY      HYSTERECTOMY    "      Review of patient's allergies indicates:  No Known Allergies        Current Outpatient Medications:     multivitamin (THERAGRAN) per tablet, Take 1 tablet by mouth once daily., Disp: , Rfl:     venlafaxine (EFFEXOR) 37.5 MG Tab, TAKE 1 TABLET(37.5 MG) BY MOUTH TWICE DAILY, Disp: 60 tablet, Rfl: 11    ALPRAZolam (XANAX) 0.25 MG tablet, Take 1 tablet (0.25 mg total) by mouth nightly as needed for Anxiety or Insomnia., Disp: 30 tablet, Rfl: 1          Objective:   Vitals:  Blood pressure 131/87, pulse 67, temperature 98 °F (36.7 °C), resp. rate 18, height 5' 1" (1.549 m).    Physical Examination:   GEN: no apparent distress, comfortable  HEAD: atraumatic and normocephalic  EYES: no pallor, no icterus  ENT:  no pharyngeal erythema  NECK: no masses, thyroid normal, no LAD/LN's, supple  CV: RRR with no murmur; normal pulse  CHEST: Normal respiratory effort; CTAB; normal breath sounds; no wheeze or crackles  ABDOM: nontender and nondistended; soft;  no rebound/guarding  MUSC/Skeletal: ROM normal; no crepitus; joints normal  EXTREM: no clubbing, cyanosis, inflammation or swelling  SKIN: varicose veins noted at legs  : no escobar  NEURO: grossly intact; motor/sensory WNL;  no tremors  PSYCH: normal mood, affect and behavior  LYMPH: normal cervical, supraclavicular, axillary and groin LN's  BREASTS:L & R breast NT without palpable mass    Assessment:   (1) 64 y.o. female with diagnosis of Stage 1 Right breast cancer, off adjuvant arimidex daily.   Mammogram. 4/8/24, negative.    (2)on effexor for HF sx.      (3) Checked  Breast Cancer Index regards 5 vs 8 years of adjuvant Rx.   Results as per HPI.  Off Arimidex now.  Observe for now.  RTC 6/2024.                             "

## 2024-07-31 NOTE — LETTER
July 31, 2024        Jeremias Coates MD  [address]  [address]  [place] [address]             [facility] [facility]  [address] [facility] [address]  [address]  [place] [address]  Phone: [phone]  Fax: [phone]   Patient: Sigrid Edward   MR Number: [id number]   YOB: 1960   Date of Visit: 7/31/2024       Dear Dr. Coates:    Thank you for referring Sigrid Edward to me for evaluation. Below are the relevant portions of my assessment and plan of care.            If you have questions, please do not hesitate to call me. I look forward to following Sigrid along with you.    Sincerely,      RAVI Berg MD           CC    No Recipients

## 2024-10-30 ENCOUNTER — OFFICE VISIT (OUTPATIENT)
Dept: FAMILY MEDICINE | Facility: CLINIC | Age: 64
End: 2024-10-30
Payer: MEDICARE

## 2024-10-30 VITALS
HEIGHT: 61 IN | DIASTOLIC BLOOD PRESSURE: 88 MMHG | SYSTOLIC BLOOD PRESSURE: 138 MMHG | OXYGEN SATURATION: 98 % | BODY MASS INDEX: 28.7 KG/M2 | HEART RATE: 68 BPM | WEIGHT: 152 LBS

## 2024-10-30 DIAGNOSIS — Z78.0 MENOPAUSE: Primary | ICD-10-CM

## 2024-10-30 DIAGNOSIS — C50.811 MALIGNANT NEOPLASM OF OVERLAPPING SITES OF RIGHT BREAST IN FEMALE, ESTROGEN RECEPTOR POSITIVE: ICD-10-CM

## 2024-10-30 DIAGNOSIS — M85.89 OSTEOPENIA OF MULTIPLE SITES: ICD-10-CM

## 2024-10-30 DIAGNOSIS — L57.0 ACTINIC KERATOSES: ICD-10-CM

## 2024-10-30 DIAGNOSIS — Z17.0 MALIGNANT NEOPLASM OF OVERLAPPING SITES OF RIGHT BREAST IN FEMALE, ESTROGEN RECEPTOR POSITIVE: ICD-10-CM

## 2024-10-30 DIAGNOSIS — D69.2 SENILE PURPURA: ICD-10-CM

## 2024-10-30 DIAGNOSIS — F51.01 PRIMARY INSOMNIA: ICD-10-CM

## 2024-10-30 DIAGNOSIS — Z12.11 SPECIAL SCREENING FOR MALIGNANT NEOPLASMS, COLON: ICD-10-CM

## 2024-10-30 DIAGNOSIS — D04.39 SQUAMOUS CELL CARCINOMA IN SITU (SCCIS) OF SKIN OF CHEEK: ICD-10-CM

## 2024-10-30 DIAGNOSIS — E78.2 MIXED HYPERLIPIDEMIA: ICD-10-CM

## 2024-11-02 PROBLEM — F51.01 PRIMARY INSOMNIA: Status: ACTIVE | Noted: 2024-11-02

## 2024-11-02 PROBLEM — M85.89 OSTEOPENIA OF MULTIPLE SITES: Status: ACTIVE | Noted: 2024-11-02

## 2024-11-08 ENCOUNTER — PATIENT OUTREACH (OUTPATIENT)
Dept: ADMINISTRATIVE | Facility: HOSPITAL | Age: 64
End: 2024-11-08
Payer: MEDICARE

## 2025-04-15 ENCOUNTER — HOSPITAL ENCOUNTER (OUTPATIENT)
Dept: RADIOLOGY | Facility: HOSPITAL | Age: 65
Discharge: HOME | End: 2025-04-15
Attending: INTERNAL MEDICINE
Payer: MEDICARE

## 2025-04-15 DIAGNOSIS — C50.811 MALIGNANT NEOPLASM OF OVERLAPPING SITES OF RIGHT BREAST IN FEMALE, ESTROGEN RECEPTOR POSITIVE: ICD-10-CM

## 2025-04-15 DIAGNOSIS — Z17.0 MALIGNANT NEOPLASM OF OVERLAPPING SITES OF RIGHT BREAST IN FEMALE, ESTROGEN RECEPTOR POSITIVE: ICD-10-CM

## 2025-04-15 DIAGNOSIS — Z12.31 ENCOUNTER FOR SCREENING MAMMOGRAM FOR BREAST CANCER: ICD-10-CM

## 2025-04-15 PROCEDURE — 77063 BREAST TOMOSYNTHESIS BI: CPT | Mod: 26,,, | Performed by: RADIOLOGY

## 2025-04-15 PROCEDURE — 77063 BREAST TOMOSYNTHESIS BI: CPT | Mod: TC,PO

## 2025-04-15 PROCEDURE — 77067 SCR MAMMO BI INCL CAD: CPT | Mod: 26,,, | Performed by: RADIOLOGY

## 2025-04-29 ENCOUNTER — TELEPHONE (OUTPATIENT)
Dept: FAMILY MEDICINE | Facility: CLINIC | Age: 65
End: 2025-04-29
Payer: MEDICARE

## 2025-04-29 DIAGNOSIS — Z78.0 MENOPAUSE: ICD-10-CM

## 2025-04-29 DIAGNOSIS — E78.2 MIXED HYPERLIPIDEMIA: ICD-10-CM

## 2025-04-29 DIAGNOSIS — Z79.899 ENCOUNTER FOR LONG-TERM (CURRENT) USE OF MEDICATIONS: Primary | ICD-10-CM

## 2025-05-07 ENCOUNTER — OFFICE VISIT (OUTPATIENT)
Dept: FAMILY MEDICINE | Facility: CLINIC | Age: 65
End: 2025-05-07
Payer: MEDICARE

## 2025-05-07 VITALS
OXYGEN SATURATION: 97 % | DIASTOLIC BLOOD PRESSURE: 84 MMHG | SYSTOLIC BLOOD PRESSURE: 122 MMHG | HEART RATE: 63 BPM | WEIGHT: 154.63 LBS | BODY MASS INDEX: 29.19 KG/M2 | HEIGHT: 61 IN

## 2025-05-07 DIAGNOSIS — E78.2 MIXED HYPERLIPIDEMIA: ICD-10-CM

## 2025-05-07 DIAGNOSIS — F51.01 PRIMARY INSOMNIA: ICD-10-CM

## 2025-05-07 DIAGNOSIS — Z17.0 MALIGNANT NEOPLASM OF OVERLAPPING SITES OF RIGHT BREAST IN FEMALE, ESTROGEN RECEPTOR POSITIVE: ICD-10-CM

## 2025-05-07 DIAGNOSIS — Z79.899 ENCOUNTER FOR LONG-TERM (CURRENT) USE OF HIGH-RISK MEDICATION: ICD-10-CM

## 2025-05-07 DIAGNOSIS — D69.2 SENILE PURPURA: ICD-10-CM

## 2025-05-07 DIAGNOSIS — D04.39 SQUAMOUS CELL CARCINOMA IN SITU (SCCIS) OF SKIN OF CHEEK: ICD-10-CM

## 2025-05-07 DIAGNOSIS — C50.811 MALIGNANT NEOPLASM OF OVERLAPPING SITES OF RIGHT BREAST IN FEMALE, ESTROGEN RECEPTOR POSITIVE: ICD-10-CM

## 2025-05-07 DIAGNOSIS — Z13.820 ENCOUNTER FOR IMAGING TO ASSESS OSTEOPOROSIS: ICD-10-CM

## 2025-05-07 DIAGNOSIS — K59.01 SLOW TRANSIT CONSTIPATION: ICD-10-CM

## 2025-05-07 DIAGNOSIS — H00.015 HORDEOLUM EXTERNUM OF LEFT LOWER EYELID: ICD-10-CM

## 2025-05-07 DIAGNOSIS — Z51.81 ENCOUNTER FOR THERAPEUTIC DRUG MONITORING: ICD-10-CM

## 2025-05-07 DIAGNOSIS — L57.0 ACTINIC KERATOSES: ICD-10-CM

## 2025-05-07 DIAGNOSIS — Z78.0 MENOPAUSE: Primary | ICD-10-CM

## 2025-05-07 RX ORDER — GENTAMICIN SULFATE 3 MG/ML
2 SOLUTION/ DROPS OPHTHALMIC 4 TIMES DAILY
Qty: 5 ML | Refills: 0 | Status: SHIPPED | OUTPATIENT
Start: 2025-05-07

## 2025-05-07 NOTE — PROGRESS NOTES
"  SUBJECTIVE:    Patient ID: Sigrid Edward is a 65 y.o. female.    Chief Complaint: Follow-up (6 mo follow up/ last xanax over 1 mo / no bottles/ left eye possible stye started over night, working in yard, has redness and slight itch //mp)    Follow-up        History of Present Illness    CHIEF COMPLAINT:  Sigrid presents today with eye irritation from yard work    EYE SYMPTOMS:  She reports lumpy and itchy eye following yard work which included weeding and grass cutting. She wore a mask during the activity but it was ineffective at preventing symptoms.    MUSCULOSKELETAL:  She experiences morning hand stiffness due to arthritis. Her joints have become enlarged and asymmetrically "knobby", particularly in one hand. Despite the stiffness, she maintains hand mobility.    MEDICATIONS:  She continues Venlafaxine twice daily for hot flashes for approximately 8 years with good effect and desires to maintain current dosage. She uses Xanax as needed for anxiety, particularly on days with emotional triggers. She has been without calcium supplements for about three weeks.    MEDICAL HISTORY:  History of breast surgery in 2015 with yearly follow-up. History of facial skin cancer removal with minimal residual scarring. History of resolved palpitations.    PREVENTIVE CARE:  Mammogram completed on February 14th with benign results. Last colonoscopy performed in 2019, with next one due in 2029.      ROS:  Constitutional: -appetite change, -chills, -fatigue, -fever, -unexpected weight change  HENT: -ear pain, -trouble swallowing  Eyes: -pain, -discharge, -visual disturbance, +eye irritation  Respiratory: -apnea, -cough, -shortness of breath, -wheezing  Cardiovascular: -chest pain, -leg swelling  Gastrointestinal: -abdominal pain, -blood in stool, -constipation, -diarrhea, -nausea, -vomiting, -reflux  Endocrine: -cold intolerance, -heat intolerance, -polydipsia, +hot flashes  Genitourinary: -bladder incontinence, -dysuria, " "-erectile dysfunction, -frequency, -hematuria, -testicular pain, -urgency, -nocturia  Musculoskeletal: -gait problem, -joint swelling, -myalgia, +joint stiffness  Neurological: -dizziness, -seizures, -numbness, +memory loss, +memory problems  Psychiatric/Behavioral: -agitation, -hallucinations, -nervous, +anxiety symptoms  Skin: +skin lesion         Patient Outreach on 11/08/2024   Component Date Value Ref Range Status    CRC Recommendation External 04/23/2019 Repeat colonoscopy in 10 years   Final       Past Medical History:   Diagnosis Date    Breast cancer      Social History[1]  Past Surgical History:   Procedure Laterality Date    BREAST BIOPSY Right     BREAST LUMPECTOMY      HYSTERECTOMY       Family History   Problem Relation Name Age of Onset    Lymphoma Mother      Breast cancer Maternal Aunt         The CVD Risk score (GENNA'Agostino, et al., 2008) failed to calculate for the following reasons:    The patient has a prior MI, stroke, CHF, or peripheral vascular disease diagnosis    All of your core healthy metrics are met.      Review of patient's allergies indicates:  No Known Allergies  Current Medications[2]    Review of Systems        Objective:      Vitals:    05/07/25 1027   BP: 122/84   Pulse: 63   SpO2: 97%   Weight: 70.1 kg (154 lb 9.6 oz)   Height: 5' 1" (1.549 m)     Physical Exam  Vitals and nursing note reviewed.   Constitutional:       General: She is not in acute distress.     Appearance: Normal appearance. She is well-developed. She is not toxic-appearing.   HENT:      Head: Normocephalic and atraumatic.      Right Ear: Tympanic membrane and external ear normal.      Left Ear: Tympanic membrane and external ear normal.      Nose: Nose normal.      Mouth/Throat:      Pharynx: Oropharynx is clear. No posterior oropharyngeal erythema.   Eyes:      Pupils: Pupils are equal, round, and reactive to light.      Comments: Left lower eyelid has a small stye   Neck:      Thyroid: No thyromegaly.      " Vascular: No carotid bruit.   Cardiovascular:      Rate and Rhythm: Normal rate and regular rhythm.      Heart sounds: Normal heart sounds. No murmur heard.  Pulmonary:      Effort: Pulmonary effort is normal.      Breath sounds: Normal breath sounds. No wheezing or rales.   Abdominal:      General: Bowel sounds are normal. There is no distension.      Palpations: Abdomen is soft.      Tenderness: There is no abdominal tenderness.   Musculoskeletal:         General: No tenderness or deformity. Normal range of motion.      Cervical back: Normal range of motion and neck supple.      Lumbar back: Normal. No spasms.      Comments: Bends 90 degrees at  waist, shoulders and knees have full range of motion, no pitting edema to lower extremities, fingers have minor D IP swellings, able to close her hand in the fist easily   Lymphadenopathy:      Cervical: No cervical adenopathy.   Skin:     General: Skin is warm and dry.      Findings: No rash.      Comments: Right temple has a scaly lesion actinic keratosis   Neurological:      General: No focal deficit present.      Mental Status: She is alert and oriented to person, place, and time. Mental status is at baseline.      Cranial Nerves: No cranial nerve deficit.      Coordination: Coordination normal.   Psychiatric:         Mood and Affect: Mood normal.         Behavior: Behavior normal.         Thought Content: Thought content normal.         Judgment: Judgment normal.       Physical Exam    HENT: Tympanic membranes normal bilaterally.  Cardiovascular: Normal heart sounds.  Pulmonary: Pulmonary effort is normal. Normal breath sounds. No wheezing. No rales.             Assessment:       1. Menopause    2. Encounter for long-term (current) use of high-risk medication    3. Encounter for therapeutic drug monitoring    4. Primary insomnia    5. Hordeolum externum of left lower eyelid    6. Encounter for imaging to assess osteoporosis    7. Actinic keratoses    8. Mixed  hyperlipidemia    9. Senile purpura    10. Malignant neoplasm of overlapping sites of right breast in female, estrogen receptor positive    11. 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 Slow transit constipation         Plan:       Menopause  -     DXA Bone Density Axial Skeleton 1 or more sites; Future; Expected date: 05/07/2025    Encounter for long-term (current) use of high-risk medication    Encounter for therapeutic drug monitoring    Primary insomnia    Hordeolum externum of left lower eyelid  -     gentamicin (GARAMYCIN) 0.3 % ophthalmic solution; Place 2 drops into the left eye 4 (four) times daily.  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    Slow transit constipation      Assessment & Plan    C50.811, Z17.0 Malignant neoplasm of overlapping sites of right female breast  H10.10 Acute atopic conjunctivitis, unspecified eye  M19.049 Primary osteoarthritis, unspecified hand  F41.9 Anxiety disorder, unspecified  L98.8 Other specified disorders of the skin and subcutaneous tissue  M85.89 Other specified disorders of bone density and structure, multiple sites  Z85.3 Personal history of malignant neoplasm of breast  Z85.828 Personal history of other malignant neoplasm of skin  Z86.79 Personal history of other diseases of the circulatory system    IMPRESSION:  - Assessed eye irritation, likely due to staph infection from outdoor work. Started antibiotic eye drops for treatment.  - Bone density results from August 2023 show osteopenia. Restarted Calcium Plus D supplement, 600 mg calcium with vitamin D3, to be taken twice daily for a total of 1200 mg calcium  per day.  - Mammogram results from February 14th confirm benign findings with no cancer.  - Venlafaxine for hot flashes at current dosage is effective.  - Crusty skin lesion near ear requires dermatological evaluation.  - Some arthritis in hands but overall good mobility in shoulders and knees.    MALIGNANT NEOPLASM OF OVERLAPPING SITES OF RIGHT FEMALE BREAST:  - Sigrid has been in remission since 2015 and is taking Venlafaxine for hot sweats related to breast surgery.  - She is comfortable with yearly check-ups at this point.  - Follow-up scheduled in August for DEXA scan with mammogram.    ACUTE CONJUNCTIVITIS:  - Sigrid reports eye irritation after exposure to dust and grass.  - Observed lumpy appearance in the eye, suggesting possible staph infection from dirt exposure.  - Prescribed antibiotic eye drops for treatment.  - Explained that staph is naturally present on skin and in dirt, potentially causing infection when introduced to vulnerable areas.    OSTEOARTHRITIS OF THE HAND:  - Sigrid reports morning stiffness in hands.  - Observed stiffness and knobby joints consistent with arthritis.  - Discussed arthritis symptoms and joint changes with patient.    ANXIETY DISORDER:  - Sigrid takes Venlafaxine for anxiety and stress.  - Medication is effective at current dosage.  - Will continue Venlafaxine twice daily.    SKIN DISORDER:  - Observed crusty skin lesions that may need cryotherapy or excision.  - Referred to dermatologist Dr. Kumar or Dr. Garrido for evaluation and potential treatment of crusty skin lesion on face.    OSTEOPENIA:  - Confirmed osteopenia diagnosis requiring supplementation.  - Restarted Calcium Plus D supplement, 600 mg calcium with vitamin D3, to be taken twice daily for a total of 1200 mg calcium per day.    HISTORY OF BREAST CANCER:  - Sigrid has a history of breast cancer, currently in remission with annual follow-ups.    HISTORY OF SKIN CANCER:  - Sigrid is cancer-free following  dermatological treatment but requires regular dermatological check-ups.  - Referred to dermatologist for regular skin cancer monitoring.  - Advised continued sun protection measures including wearing a wide-brimmed hat and sunscreen during outdoor activities.    CIRCULATORY HEALTH:  - Sigrid reports no current chest pain or heart issues, indicating stable circulatory health.  - Ordered cholesterol and routine labs to be performed during current visit.    FOLLOW-UP:  - Follow up in  for next colonoscopy.         Follow up in about 6 months (around 2025), or Menopause, osteoarthritis.        This note was generated with the assistance of ambient listening technology. Verbal consent was obtained by the patient and accompanying visitor(s) for the recording of patient appointment to facilitate this note. I attest to having reviewed and edited the generated note for accuracy, though some syntax or spelling errors may persist. Please contact the author of this note for any clarification.      2025 Jeremias Coates           [1]   Social History  Socioeconomic History    Marital status:    Tobacco Use    Smoking status: Former     Current packs/day: 0.00     Types: Cigarettes     Quit date: 2016     Years since quittin.8    Smokeless tobacco: Never   Substance and Sexual Activity    Alcohol use: No    Drug use: No   [2]   Current Outpatient Medications:     ALPRAZolam (XANAX) 0.25 MG tablet, Take 1 tablet (0.25 mg total) by mouth nightly as needed for Anxiety or Insomnia., Disp: 30 tablet, Rfl: 1    venlafaxine (EFFEXOR) 37.5 MG Tab, TAKE 1 TABLET(37.5 MG) BY MOUTH TWICE DAILY, Disp: 60 tablet, Rfl: 11    gentamicin (GARAMYCIN) 0.3 % ophthalmic solution, Place 2 drops into the left eye 4 (four) times daily., Disp: 5 mL, Rfl: 0    multivitamin (THERAGRAN) per tablet, Take 1 tablet by mouth once daily. (Patient not taking: Reported on 2025), Disp: , Rfl:

## 2025-05-10 ENCOUNTER — RESULTS FOLLOW-UP (OUTPATIENT)
Dept: FAMILY MEDICINE | Facility: CLINIC | Age: 65
End: 2025-05-10

## 2025-05-11 NOTE — PROGRESS NOTES
Call patient.  Sugar , thyroid and liver enzymes are normal ,kidney function is slightly decreased.  GFR down to 48.  Need to increase your water intake during the day.  Cholesterol excellent at 188 triglycerides 83.  Urinalysis has no significant bladder infection CBC shows no anemia.  Continue current medications /recheck CMP and lipids again in six-months

## 2025-06-05 DIAGNOSIS — T45.1X5A HOT FLASHES RELATED TO AROMATASE INHIBITOR THERAPY: ICD-10-CM

## 2025-06-05 DIAGNOSIS — R23.2 HOT FLASHES RELATED TO AROMATASE INHIBITOR THERAPY: ICD-10-CM

## 2025-06-05 RX ORDER — VENLAFAXINE 37.5 MG/1
37.5 TABLET ORAL 2 TIMES DAILY
Qty: 60 TABLET | Refills: 11 | Status: SHIPPED | OUTPATIENT
Start: 2025-06-05

## 2025-06-16 ENCOUNTER — OFFICE VISIT (OUTPATIENT)
Facility: CLINIC | Age: 65
End: 2025-06-16
Payer: MEDICARE

## 2025-06-16 VITALS
TEMPERATURE: 98 F | BODY MASS INDEX: 29.43 KG/M2 | DIASTOLIC BLOOD PRESSURE: 88 MMHG | OXYGEN SATURATION: 95 % | SYSTOLIC BLOOD PRESSURE: 154 MMHG | HEART RATE: 63 BPM | HEIGHT: 61 IN | RESPIRATION RATE: 17 BRPM | WEIGHT: 155.88 LBS

## 2025-06-16 DIAGNOSIS — R23.2 HOT FLASHES RELATED TO AROMATASE INHIBITOR THERAPY: ICD-10-CM

## 2025-06-16 DIAGNOSIS — Z78.0 MENOPAUSE: ICD-10-CM

## 2025-06-16 DIAGNOSIS — Z12.31 BREAST CANCER SCREENING BY MAMMOGRAM: Primary | ICD-10-CM

## 2025-06-16 DIAGNOSIS — Z85.3 HISTORY OF BREAST CANCER: ICD-10-CM

## 2025-06-16 DIAGNOSIS — T45.1X5A HOT FLASHES RELATED TO AROMATASE INHIBITOR THERAPY: ICD-10-CM

## 2025-06-16 PROCEDURE — 99999 PR PBB SHADOW E&M-EST. PATIENT-LVL IV: CPT | Mod: PBBFAC,,, | Performed by: INTERNAL MEDICINE

## 2025-06-16 PROCEDURE — 1126F AMNT PAIN NOTED NONE PRSNT: CPT | Mod: CPTII,S$GLB,, | Performed by: INTERNAL MEDICINE

## 2025-06-16 PROCEDURE — G2211 COMPLEX E/M VISIT ADD ON: HCPCS | Mod: S$GLB,,, | Performed by: INTERNAL MEDICINE

## 2025-06-16 PROCEDURE — 1159F MED LIST DOCD IN RCRD: CPT | Mod: CPTII,S$GLB,, | Performed by: INTERNAL MEDICINE

## 2025-06-16 PROCEDURE — 3288F FALL RISK ASSESSMENT DOCD: CPT | Mod: CPTII,S$GLB,, | Performed by: INTERNAL MEDICINE

## 2025-06-16 PROCEDURE — 1101F PT FALLS ASSESS-DOCD LE1/YR: CPT | Mod: CPTII,S$GLB,, | Performed by: INTERNAL MEDICINE

## 2025-06-16 PROCEDURE — 3008F BODY MASS INDEX DOCD: CPT | Mod: CPTII,S$GLB,, | Performed by: INTERNAL MEDICINE

## 2025-06-16 PROCEDURE — 3077F SYST BP >= 140 MM HG: CPT | Mod: CPTII,S$GLB,, | Performed by: INTERNAL MEDICINE

## 2025-06-16 PROCEDURE — 99215 OFFICE O/P EST HI 40 MIN: CPT | Mod: S$GLB,,, | Performed by: INTERNAL MEDICINE

## 2025-06-16 PROCEDURE — 3079F DIAST BP 80-89 MM HG: CPT | Mod: CPTII,S$GLB,, | Performed by: INTERNAL MEDICINE

## 2025-06-16 RX ORDER — VENLAFAXINE 37.5 MG/1
37.5 TABLET ORAL 2 TIMES DAILY
Qty: 60 TABLET | Refills: 11 | Status: SHIPPED | OUTPATIENT
Start: 2025-06-16

## 2025-06-16 NOTE — PROGRESS NOTES
[facility] [address] Hematology Oncology In Office Follow Up Encounter  Note    25    Subjective:      Patient ID:   Sigrid Edward  65 y.o. female  1960  [place]        Chief Complaint:   Breast cancer follow up    HPI:  65 y.o. female with diagnosis of R breast cancer, Stage 1 dx. 1/11/15. Treated with R partial mastectomy and Rad Rx.  Presently off Arimidex adj. Rx.  HF sx better on Effexor BID.  She fell and fx R humerus.    Cancer Dx > 10 years ago.  In [place].  Followed with observation and examination yearly.  Seen today 25.    > 10  years out, off Arimidex.     Referred to Dr. Kline for colonoscopy.Negative results per pt.    Skin cancers removed from face, Dr. Booker.    Oncotype dx 7% recur with adjuvant T or A.  ERP +, PRP +. Her 2 pedro  Neg.    Breast Cancer Index 2% risk of recurrence at years 5-10, after 5 years of adjuvant Rx.  Extended adjuvant endocrine Rx not recommended.  Off  Arimidex now.  Observe for now.  Continue Effexor BID for HF sx.    Mamm  Negative [facility].  4/15/25  Check in 1 year [facility].    She had covid, undecided on vaccine.    Smoke 1/2 to 1 ppd.  ETOH no.  Allergy no.  Job special .    R partial mastectomy, partial hysterectomy.  M0  Menses onset at 11.  1st child at 21.  No increase joint sx.    Dad COPD. Mom NHL, DM. Recent shingles.    Her mom passed away 2024 of colon perforation, Sepsis.    ROS:   GEN: normal without any fever, night sweats or weight loss  HEENT: normal with no HA's, sore throat, stiff neck, changes in vision  CV: normal with no CP, SOB, PND, MEJIA or orthopnea  PULM: normal with no SOB, cough, hemoptysis, sputum or pleuritic pain  GI: normal with no abdominal pain, nausea, vomiting, constipation, diarrhea, melanotic stools, BRBPR, or hematemesis  : normal with no hematuria, dysuria  BREAST: See HPI  SKIN: normal with no rash, erythema, bruising, or swelling     Past Medical History:   Diagnosis Date  "   Breast cancer      Past Surgical History:   Procedure Laterality Date    BREAST BIOPSY Right     BREAST LUMPECTOMY      HYSTERECTOMY         Review of patient's allergies indicates:  No Known Allergies        Current Outpatient Medications:     multivitamin (THERAGRAN) per tablet, Take 1 tablet by mouth once daily., Disp: , Rfl:     venlafaxine (EFFEXOR) 37.5 MG Tab, Take 1 tablet (37.5 mg total) by mouth 2 (two) times daily., Disp: 60 tablet, Rfl: 11    ALPRAZolam (XANAX) 0.25 MG tablet, Take 1 tablet (0.25 mg total) by mouth nightly as needed for Anxiety or Insomnia., Disp: 30 tablet, Rfl: 1    gentamicin (GARAMYCIN) 0.3 % ophthalmic solution, Place 2 drops into the left eye 4 (four) times daily. (Patient not taking: Reported on 6/16/2025), Disp: 5 mL, Rfl: 0          Objective:   Vitals:  Blood pressure (!) 154/88, pulse 63, temperature 97.9 °F (36.6 °C), temperature source Temporal, resp. rate 17, height 5' 1" (1.549 m), weight 70.7 kg (155 lb 14.4 oz), SpO2 95%.    Physical Examination:   GEN: no apparent distress, comfortable  HEAD: atraumatic and normocephalic  EYES: no pallor, no icterus  ENT:  no pharyngeal erythema  NECK: no masses, thyroid normal, no LAD/LN's, supple  CV: RRR with no murmur; normal pulse  CHEST: Normal respiratory effort; CTAB; normal breath sounds; no wheeze or crackles  ABDOM: nontender and nondistended; soft;  no rebound/guarding  MUSC/Skeletal: ROM normal; no crepitus; joints normal  EXTREM: no clubbing, cyanosis, inflammation or swelling  SKIN: varicose veins noted at legs  : no escobar  NEURO: grossly intact; motor/sensory WNL;  no tremors  PSYCH: normal mood, affect and behavior  LYMPH: normal cervical, supraclavicular, axillary and groin LN's  BREASTS:L & R breast NT without palpable mass    Assessment:   (1) 65 y.o. female with diagnosis of Stage 1 Right breast cancer, off adjuvant arimidex daily.   Mammogram. 4/15/25, negative.  Greater than 10 years since breast cancer " Dx.    (2)on effexor for HF sx.      (3) Checked  Breast Cancer Index regards 5 vs 8 years of adjuvant Rx.   Results as per HPI.  Off Arimidex now.  Observe for now.  RTC 6/2025.

## 2025-08-15 ENCOUNTER — HOSPITAL ENCOUNTER (OUTPATIENT)
Dept: RADIOLOGY | Facility: HOSPITAL | Age: 65
Discharge: HOME | End: 2025-08-15
Attending: FAMILY MEDICINE
Payer: MEDICARE

## 2025-08-15 DIAGNOSIS — Z78.0 MENOPAUSE: ICD-10-CM

## 2025-08-15 DIAGNOSIS — Z13.820 ENCOUNTER FOR IMAGING TO ASSESS OSTEOPOROSIS: ICD-10-CM

## 2025-08-15 PROCEDURE — 77080 DXA BONE DENSITY AXIAL: CPT | Mod: TC,PO

## 2025-08-15 PROCEDURE — 77080 DXA BONE DENSITY AXIAL: CPT | Mod: 26,,, | Performed by: RADIOLOGY
